# Patient Record
Sex: FEMALE | Race: WHITE | NOT HISPANIC OR LATINO | Employment: STUDENT | URBAN - METROPOLITAN AREA
[De-identification: names, ages, dates, MRNs, and addresses within clinical notes are randomized per-mention and may not be internally consistent; named-entity substitution may affect disease eponyms.]

---
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
## 2023-03-12 ENCOUNTER — OFFICE VISIT (OUTPATIENT)
Dept: URGENT CARE | Facility: CLINIC | Age: 18
End: 2023-03-12

## 2023-03-12 VITALS
SYSTOLIC BLOOD PRESSURE: 127 MMHG | HEART RATE: 99 BPM | DIASTOLIC BLOOD PRESSURE: 76 MMHG | RESPIRATION RATE: 16 BRPM | OXYGEN SATURATION: 99 % | TEMPERATURE: 98.6 F

## 2023-03-12 DIAGNOSIS — J02.9 SORE THROAT: Primary | ICD-10-CM

## 2023-03-12 DIAGNOSIS — J35.8 TONSILLAR EXUDATE: ICD-10-CM

## 2023-03-12 LAB — S PYO AG THROAT QL: NEGATIVE

## 2023-03-12 NOTE — PATIENT INSTRUCTIONS
--Rest, drink plenty of fluids  --Rapid strep test negative  Will send full throat culture, calling if results are positive (anticipate 48-72 hours)  --For sore throat, you can take OTC lozenges, use warm gargles (salt water or apple cider vinegar and honey), herbal teas, or an OTC throat spray (Chloraseptic)  --You can take Tylenol or Motrin/Advil as needed for fever, headache, body aches  Motrin/Advil should be avoided, however, if you have a history of heart disease, bleeding ulcers, or if you take blood thinners  --You should contact your primary care provider and/or go to the ER if your symptoms are not improved or get worse over the next 3-6 days  This includes new onset fever, localized ear pain, sinus pain, as well as worsening sore throat, cough, chest pain, shortness of breath, or significant weakness/fatigue

## 2023-03-12 NOTE — PROGRESS NOTES
[facility]        NAME: Quoc Lee is a 21 y o  adult  : 2005    MRN: [id number]  DATE: 2023  TIME: 1:46 PM    Assessment and Plan   Sore throat [J02 9]  1  Sore throat  POCT rapid strepA   2  Tonsillar exudate  Throat culture         Patient Instructions     --Rest, drink plenty of fluids  --Rapid strep test negative  Will send full throat culture, calling if results are positive (anticipate 48-72 hours)  --For sore throat, you can take OTC lozenges, use warm gargles (salt water or apple cider vinegar and honey), herbal teas, or an OTC throat spray (Chloraseptic)  --You can take Tylenol or Motrin/Advil as needed for fever, headache, body aches  Motrin/Advil should be avoided, however, if you have a history of heart disease, bleeding ulcers, or if you take blood thinners  --You should contact your primary care provider and/or go to the ER if your symptoms are not improved or get worse over the next 3-6 days  This includes new onset fever, localized ear pain, sinus pain, as well as worsening sore throat, cough, chest pain, shortness of breath, or significant weakness/fatigue  Chief Complaint     Chief Complaint   Patient presents with   • Sore Throat     Pt presents with tonsil swelling that was noticed this morning, denies pain with swallowing  History of Present Illness       Here with complaints of drainage in back of throat since this morning  Throat not sore and no pain with swallowing, however  Had cold a week ago, but better no  No current fever, nasal congestion, cough, headache, body aches, fatigue, rash  Had mono in the past    No OTC/home treatments tried  Review of Systems   Review of Systems   Constitutional: Negative for fever  HENT: Negative for sore throat and trouble swallowing  Respiratory: Negative for cough  Neurological: Negative for headaches           Current Medications       Current Outpatient Medications:   • norethindrone-ethinyl estradiol (Junel FE 1/20) 1-20 MG-MCG per tablet, Take 1 tablet by mouth daily, Disp: 28 tablet, Rfl: 11  •  triamcinolone (KENALOG) 0 1 % cream, Apply topically 2 (two) times a day (Patient not taking: Reported on 8/11/2022), Disp: 30 g, Rfl: 0    Current Allergies     Allergies as of 03/12/2023 - Reviewed 03/12/2023   Allergen Reaction Noted   • Penicillins Rash 09/08/2018            The following portions of the patient's history were reviewed and updated as appropriate: allergies, current medications, past family history, past medical history, past social history, past surgical history and problem list      Past Medical History:   Diagnosis Date   • Fracture    • Mononucleosis    • Raynaud's disease        No past surgical history on file  No family history on file  Medications have been verified  Objective   /76   Pulse 99   Temp 98 6 °F (37 °C)   Resp 16   SpO2 99%   No LMP recorded  Physical Exam     Physical Exam  Constitutional:       General: Caitlin Dawn is not in acute distress  Appearance: Caitlin Dawn is well-developed  Chon Andrew is not diaphoretic  HENT:      Head: Normocephalic and atraumatic  Right Ear: External ear normal       Left Ear: Tympanic membrane and external ear normal       Nose: Nose normal  No congestion or rhinorrhea  Mouth/Throat:      Pharynx: Posterior oropharyngeal erythema present  No oropharyngeal exudate  Comments: Tonsils 1-2+, mildly erythematous with few small (1-2 mm) spots of white exudate  Uvula midline  Eyes:      Conjunctiva/sclera: Conjunctivae normal       Pupils: Pupils are equal, round, and reactive to light  Neck:      Thyroid: No thyromegaly  Cardiovascular:      Rate and Rhythm: Normal rate and regular rhythm  Heart sounds: Normal heart sounds  Pulmonary:      Effort: Pulmonary effort is normal       Breath sounds: Normal breath sounds     Abdominal:      General: Bowel sounds are normal       Palpations: Abdomen is soft  Tenderness: There is no abdominal tenderness  Musculoskeletal:      Cervical back: Neck supple  Lymphadenopathy:      Cervical: No cervical adenopathy  Skin:     General: Skin is warm and dry  Neurological:      Mental Status: Philipp Marti is alert and oriented to person, place, and time  Deep Tendon Reflexes: Reflexes are normal and symmetric

## 2023-03-16 LAB — BACTERIA THROAT CULT: NORMAL

## 2023-07-19 DIAGNOSIS — N92.6 IRREGULAR MENSES: ICD-10-CM

## 2023-07-19 RX ORDER — NORETHINDRONE ACETATE AND ETHINYL ESTRADIOL AND FERROUS FUMARATE 1MG-20(21)
KIT ORAL
Qty: 28 TABLET | Refills: 3 | Status: SHIPPED | OUTPATIENT
Start: 2023-07-19

## 2023-07-26 ENCOUNTER — TELEPHONE (OUTPATIENT)
Dept: OBGYN CLINIC | Facility: CLINIC | Age: 18
End: 2023-07-26

## 2023-07-26 DIAGNOSIS — R10.2 PELVIC PAIN: Primary | ICD-10-CM

## 2023-07-26 NOTE — TELEPHONE ENCOUNTER
----- [facility] sent at 7/26/2023  9:52 AM EDT -----  Regarding: Should I make an appointment? Contact: [phone]  Hello, my period has been different this week and I don't know if I should make an appointment about this or not. I usually get cramps and headaches with every period, but this monday and tuesday both cramps and headaches were really bad. On Monday night I experienced really bad cramps to the point that I was feeling pain from my lower left abdomen and left leg. And then today, Wednesday, I ended up having to leave work early due to having waves of nausea. Another thing I should probably add is that I was researching about my cramps (specifically period cramps/pain in my leg), and endometriosis was mentioned. I looked more into endometriosis and I realized I may have more symptoms such as cramp pain in my lower abdomen, pelvis, and lower back/butt (it feels like its on my tail bone); I also experience some pain during sexual intercourse.

## 2023-07-26 NOTE — TELEPHONE ENCOUNTER
Lm patient's as re: recommendation to have pelvic US then office appointment follow up/KA. Rad order for same in patient's chart.

## 2023-07-26 NOTE — TELEPHONE ENCOUNTER
Patient informed of KA's recommendation - she will schedule appointment time & recall office after scheduling to schedule follow up appointment in office.

## 2023-07-26 NOTE — TELEPHONE ENCOUNTER
Patient was to have repeat pelvic US 2/2023 per your recommendation @ appointment 10/2022. She has yearly appointment scheduled 10/12/2023.

## 2023-08-02 ENCOUNTER — HOSPITAL ENCOUNTER (OUTPATIENT)
Dept: ULTRASOUND IMAGING | Facility: HOSPITAL | Age: 18
Discharge: HOME | End: 2023-08-02
Payer: COMMERCIAL

## 2023-08-02 DIAGNOSIS — R10.2 PELVIC PAIN: ICD-10-CM

## 2023-08-02 PROCEDURE — 76856 US EXAM PELVIC COMPLETE: CPT

## 2023-08-02 PROCEDURE — 76830 TRANSVAGINAL US NON-OB: CPT

## 2023-08-15 ENCOUNTER — TELEPHONE (OUTPATIENT)
Dept: OBGYN CLINIC | Facility: CLINIC | Age: 18
End: 2023-08-15

## 2023-09-06 ENCOUNTER — OFFICE VISIT (OUTPATIENT)
Dept: INTERNAL MEDICINE CLINIC | Facility: CLINIC | Age: 18
End: 2023-09-06
Payer: COMMERCIAL

## 2023-09-06 VITALS
TEMPERATURE: 99 F | HEART RATE: 72 BPM | HEIGHT: 64 IN | DIASTOLIC BLOOD PRESSURE: 78 MMHG | BODY MASS INDEX: 20.49 KG/M2 | WEIGHT: 120 LBS | OXYGEN SATURATION: 99 % | SYSTOLIC BLOOD PRESSURE: 116 MMHG

## 2023-09-06 DIAGNOSIS — M70.41 PREPATELLAR BURSITIS OF RIGHT KNEE: Primary | ICD-10-CM

## 2023-09-06 DIAGNOSIS — M25.461 PAIN AND SWELLING OF RIGHT KNEE: ICD-10-CM

## 2023-09-06 DIAGNOSIS — M25.561 PAIN AND SWELLING OF RIGHT KNEE: ICD-10-CM

## 2023-09-06 PROCEDURE — 99204 OFFICE O/P NEW MOD 45 MIN: CPT | Performed by: INTERNAL MEDICINE

## 2023-09-06 RX ORDER — IBUPROFEN 400 MG/1
400 TABLET ORAL EVERY 12 HOURS PRN
Qty: 15 TABLET | Refills: 0 | Status: SHIPPED | OUTPATIENT
Start: 2023-09-06

## 2023-09-06 NOTE — LETTER
September 6, 2023     Patient: Gary Perez  YOB: 2005  Date of Visit: 9/6/2023      To Whom it May Concern: Isidoro Hill is under my professional care. Norvell Lombard was seen in my office on 9/6/2023. Please allow Norvell Lombard to work light duty/limit walking on September 8, 2023 for medical reasons. If you have any questions or concerns, please don't hesitate to call.          Sincerely,          Breana Jones, DO

## 2023-09-06 NOTE — PATIENT INSTRUCTIONS
Motrin twice a day as needed, take with food  Orthopedics appointment  Avoid running or impact exercises with right leg for now    Patellar Tendinitis   WHAT YOU NEED TO KNOW:   What is patellar tendinitis? Patellar tendinitis is inflammation or irritation of the tendon that connects your kneecap to your shinbone. It may be a short-term condition or develop into a long-term weakness in your knee. What increases my risk for patellar tendinitis? Overuse or sudden increase in activity    Activities that involve jumping or quick changes of direction    Lack of flexibility     Muscle weakness or imbalance     Sedentary lifestyle    Certain antibiotics    What are the signs and symptoms of patellar tendinitis? Patellar tendonitis begins with pain or swelling at your knee. You may feel sharp or aching pain before or after activity. Your condition may progress until you feel pain all of the time. How is patellar tendonitis diagnosed? Your healthcare provider will examine your knee and the muscles around your knee. Your provider will ask about your symptoms and when they began. Your provider will also ask if you have recently taken antibiotics or have had a previous knee injury. An ultrasound  uses sound waves to show pictures of your knee joint on a monitor. An ultrasound may be done to check for the cause of your symptoms. An x-ray or MRI  takes pictures to show if you have damage in your patellar tendon. You may be given dye to help the pictures show up better. Tell your healthcare provider if you have ever had an allergic reaction to contrast dye. Do not enter the MRI room with anything metal. Metal can cause serious injury. Tell the healthcare provider if you have any metal in or on your body. How is patellar tendinitis treated? NSAIDs , such as ibuprofen, help decrease swelling, pain, and fever. This medicine is available with or without a doctor's order.  NSAIDs can cause stomach bleeding or kidney problems in certain people. If you take blood thinner medicine, always ask if NSAIDs are safe for you. Always read the medicine label and follow directions. Do not give these medicines to children younger than 6 months without direction from a healthcare provider. Acetaminophen  decreases pain. This medicine is available without a doctor's order. Ask how much to take and how often to take it. Acetaminophen can cause liver damage if not taken correctly. Injections  may reduce inflammation and help your tendon heal.     Surgery  may be needed remove damaged tissue to allow the tendon to heal.    How can I manage my symptoms? Rest  your knee so it can heal. Do not begin an activity until directed by your healthcare provider. Ice  your knee 15 to 20 minutes every hour or as directed. Use and ice pack, or put crushed ice in a bag. Cover it with a towel. Ice prevents tissue damage and decreases swelling and pain. Support  your knee as directed. Ask your healthcare provider for more information on types of braces that support your patellar tendon and allow it to heal.     Go to physical therapy  as directed. A physical therapist can teach you exercises to stretch and strengthen leg muscles that support your tendon. When should I seek immediate care? You hear a sudden snap or pop or see immediate bruising around your knee. You cannot bend your knee or bear weight on your leg. When should I call my doctor? You have a fever. You have sudden swelling in your knee. Your pain continues or gets worse even after you take pain medicine. The skin over your knee becomes red and swollen. You have questions or concerns about your condition or care. CARE AGREEMENT:   You have the right to help plan your care. Learn about your health condition and how it may be treated. Discuss treatment options with your healthcare providers to decide what care you want to receive.  You always have the right to refuse treatment. The above information is an  only. It is not intended as medical advice for individual conditions or treatments. Talk to your doctor, nurse or pharmacist before following any medical regimen to see if it is safe and effective for you. © Copyright [employer] 2022 Information is for End User's use only and may not be sold, redistributed or otherwise used for commercial purposes.

## 2023-09-06 NOTE — PROGRESS NOTES
Name: Todd Contreras      : 2005      MRN: [id number]  Encounter Provider: Carmelina Morton DO  Encounter Date: 2023   Encounter department: [address] [place] [address]     1. Prepatellar bursitis of right knee  Comments:  suspect pre-patellar bursitis/tendonitis. c/w nsaids and avoid high impact exercises/tap dance for now. f/u ortho for possible bursal injection  Orders:  -     Ambulatory Referral to Orthopedic Surgery; Future  -     ibuprofen (MOTRIN) 400 mg tablet; Take 1 tablet (400 mg total) by mouth every 12 (twelve) hours as needed for mild pain or moderate pain (take with food)    2. Pain and swelling of right knee  -     Ambulatory Referral to Orthopedic Surgery; Future  -     ibuprofen (MOTRIN) 400 mg tablet; Take 1 tablet (400 mg total) by mouth every 12 (twelve) hours as needed for mild pain or moderate pain (take with food)        Depression Screening and Follow-up Plan: Patient was screened for depression during today's encounter. They screened negative with a PHQ-2 score of 0. Subjective      HPI     New to practice, here to establish care and with c/o right knee swelling and pain off/on for the last 2 weeks. No previous knee problems or injury and no fall/trauma. Pain was worse while walking a lot at work 2 weeks ago. Her anterior knee swelled as well. It got a bit better with NSAIDs and rest but flared back up again last week and this time with right ankle pain as well. She has not been doing high impact exercises but does tap dance at her [address] [place] [address]. She walks 10K+ steps while at work. She has pain 2-3/10 currently but it was 8.5/10 at its worst 2 weeks ago. She believes she is UTD on her vaccines from her pediatrician. ROS otherwise negative, no other complaints. Review of Systems   Constitutional: Negative for fever. HENT: Negative for congestion. Eyes: Negative for visual disturbance.    Respiratory: Negative for shortness of breath. Cardiovascular: Negative for chest pain. Gastrointestinal: Negative for abdominal pain. Endocrine: Negative for polyuria. Genitourinary: Negative for difficulty urinating. Musculoskeletal: Positive for arthralgias, gait problem (due to off/on knee pain) and joint swelling. Skin: Negative for rash. Allergic/Immunologic: Negative for immunocompromised state. Neurological: Negative for dizziness. Psychiatric/Behavioral: Negative for dysphoric mood. Current Outpatient Medications on File Prior to Visit   Medication Sig   • Junel FE 1/20 1-20 MG-MCG per tablet TAKE ONE TABLET BY MOUTH EVERY DAY   • triamcinolone (KENALOG) 0.1 % cream Apply topically 2 (two) times a day (Patient not taking: Reported on 8/11/2022)       Objective     /78 (BP Location: Left arm, Patient Position: Sitting, Cuff Size: Standard)   Pulse 72   Temp 99 °F (37.2 °C)   Ht 5' 4" (1.626 m)   Wt 54.4 kg (120 lb)   SpO2 99%   BMI 20.60 kg/m²     Physical Exam  Vitals reviewed. Constitutional:       General: Rose Sheffield is not in acute distress. HENT:      Head: Normocephalic and atraumatic. Right Ear: Tympanic membrane normal.      Left Ear: Tympanic membrane normal.   Eyes:      Conjunctiva/sclera: Conjunctivae normal.   Cardiovascular:      Rate and Rhythm: Normal rate and regular rhythm. Heart sounds: No murmur heard. Pulmonary:      Effort: Pulmonary effort is normal.      Breath sounds: No wheezing or rales. Abdominal:      General: Bowel sounds are normal.      Palpations: Abdomen is soft. Tenderness: There is no abdominal tenderness. Musculoskeletal:      Cervical back: Neck supple. Right knee: Swelling (anterior knee(pre-patellar area)) present. No bony tenderness or crepitus. Normal range of motion. Tenderness present over the patellar tendon. Instability Tests: Anterior drawer test negative. Left knee: Normal range of motion.       Instability Tests: Anterior drawer test negative. Right lower leg: No edema. Left lower leg: No edema. Right ankle: No swelling. No tenderness. Normal range of motion. Anterior drawer test negative. Right Achilles Tendon: No tenderness. Comments: No knee pain with kneeling/squatting   Neurological:      Mental Status: Iker Schaefer is alert. Mental status is at baseline.    Psychiatric:         Mood and Affect: Mood normal.         Behavior: Behavior normal.       Joao Molina,

## 2023-09-11 ENCOUNTER — OFFICE VISIT (OUTPATIENT)
Dept: OBGYN CLINIC | Facility: CLINIC | Age: 18
End: 2023-09-11

## 2023-09-11 VITALS
HEIGHT: 64 IN | BODY MASS INDEX: 20.49 KG/M2 | WEIGHT: 120 LBS | SYSTOLIC BLOOD PRESSURE: 90 MMHG | DIASTOLIC BLOOD PRESSURE: 60 MMHG

## 2023-09-11 DIAGNOSIS — M22.2X1 PATELLOFEMORAL SYNDROME OF RIGHT KNEE: Primary | ICD-10-CM

## 2023-09-11 DIAGNOSIS — M25.561 ACUTE PAIN OF RIGHT KNEE: ICD-10-CM

## 2023-09-11 RX ORDER — MELOXICAM 15 MG/1
15 TABLET ORAL DAILY
Qty: 30 TABLET | Refills: 0 | Status: SHIPPED | OUTPATIENT
Start: 2023-09-11

## 2023-09-11 NOTE — PROGRESS NOTES
Patient Name:  Hailey Estes  MRN:  [id number]    Assessment & Plan     Right knee pain, likely patellofemoral dysfunction. 1. Patient exhibits signs and symptoms of right knee patellofemoral dysfunction. 2. Referral placed for formal physical therapy. 3. A prescription for meloxicam to be taken in place of ibuprofen. 4. Jaime pull knee sleeve provided today. 5. Activities as tolerated with modification avoid pain. 6. Follow-up in 6 weeks. Consider MRI at that time as indicated. Chief Complaint     Right knee pain    History of the Present Illness     Hailey Estes is a 21 y.o. adult who reports to the office today for evaluation of the right knee. Patient notes an onset of pain a few weeks ago. Patient denies any injury or trauma. Pain is localized to the anterior aspect of the knee and worse with prolonged standing and walking as well as walking up and down steps. Patient feels her pain is exacerbated with work. Patient does note some swelling anteriorly. Patient denies any weakness or instability or giving way. No numbness or tingling. No fevers or chills. Patient has tried ibuprofen without significant improvement. Physical Exam     BP 90/60   Ht 5' 4" (1.626 m)   Wt 54.4 kg (120 lb)   BMI 20.60 kg/m²     Right knee: No gross deformity. Skin intact. No erythema ecchymosis or swelling. No effusion. No tenderness over the medial and lateral joint line. No tenderness patellar tendon or prepatellar bursa. Full range of motion without significant pain. Stable to varus and valgus stress without pain. Stable Lachman test.  Negative posterior drawer test.  Negative Chuck's test.  Positive patellar grind test.  Negative patellar apprehension test.  Extensor mechanism is intact. Sensation is intact distally. Eyes: Anicteric sclerae. ENT: Trachea midline. Lungs: Normal respiratory effort. CV: Capillary refill is less than 2 seconds. Skin: Intact without erythema.   Lymph: No palpable lymphadenopathy. Neuro: Sensation is grossly intact to light touch. Psych: Mood and affect are appropriate. Data Review     I have personally reviewed pertinent films in PACS, and my interpretation follows:    X-rays right knee 9/11/2023: No acute osseous abnormality. No fracture or dislocation. No significant degenerative disc. There is evidence of some patellar tilt on the sunrise view. Past Medical History:   Diagnosis Date   • Fracture    • Mononucleosis    • Raynaud's disease        Past Surgical History:   Procedure Laterality Date   • CLOSED REDUCTION WRIST FRACTURE Left        Allergies   Allergen Reactions   • Penicillins Rash       Current Outpatient Medications on File Prior to Visit   Medication Sig Dispense Refill   • ibuprofen (MOTRIN) 400 mg tablet Take 1 tablet (400 mg total) by mouth every 12 (twelve) hours as needed for mild pain or moderate pain (take with food) 15 tablet 0   • Junel FE 1/20 1-20 MG-MCG per tablet TAKE ONE TABLET BY MOUTH EVERY DAY 28 tablet 3   • triamcinolone (KENALOG) 0.1 % cream Apply topically 2 (two) times a day (Patient not taking: Reported on 8/11/2022) 30 g 0     No current facility-administered medications on file prior to visit. Social History     Tobacco Use   • Smoking status: Never   • Smokeless tobacco: Never   Vaping Use   • Vaping Use: Never used   Substance Use Topics   • Alcohol use: Never   • Drug use: Yes     Types: Marijuana       Family History   Problem Relation Age of Onset   • Diabetes Maternal Grandmother    • Graves' disease Paternal Grandmother    • Cancer Neg Hx    • Heart disease Neg Hx        Review of Systems     As stated in the HPI. All other systems reviewed and are negative.

## 2023-09-25 ENCOUNTER — EVALUATION (OUTPATIENT)
Dept: PHYSICAL THERAPY | Facility: CLINIC | Age: 18
End: 2023-09-25
Payer: COMMERCIAL

## 2023-09-25 DIAGNOSIS — M22.2X1 PATELLOFEMORAL SYNDROME OF RIGHT KNEE: Primary | ICD-10-CM

## 2023-09-25 PROCEDURE — 97140 MANUAL THERAPY 1/> REGIONS: CPT

## 2023-09-25 PROCEDURE — 97110 THERAPEUTIC EXERCISES: CPT

## 2023-09-25 PROCEDURE — 97161 PT EVAL LOW COMPLEX 20 MIN: CPT

## 2023-09-25 NOTE — PROGRESS NOTES
PT Evaluation     Today's date: 2023  Patient name: Sterling Marshall  : 2005  MRN: [id number]  Referring provider: Ara Lubin*  Dx:   Encounter Diagnosis     ICD-10-CM    1. Patellofemoral syndrome of right knee  M22.2X1 Ambulatory Referral to Physical Therapy          Start Time: 1100  Stop Time: 1150  Total time in clinic (min): 50 minutes    Assessment  Assessment details: Pt is a 19y.o. year old adult presenting to physical therapy for patellofemoral syndrome of right knee. She presents with the following impairments: decreased right hip strength deficits with biggest weakness being with flexion, abduction, and extension, hypermobility medially and laterally of her right patella, TTP inferior to her patella on her right knee, and increased tension in her right quad and ITB affecting her function with walking, running, lifting, and other ADLs. Pt will benefit from skilled physical therapy to address functional limitations noted in evaluation and meet patient goals. Impairments: abnormal muscle firing, abnormal or restricted ROM, activity intolerance, impaired physical strength, lacks appropriate home exercise program, pain with function and poor body mechanics    Symptom irritability: moderateUnderstanding of Dx/Px/POC: good   Prognosis: good    Goals  ST. Pt will be independent with HEP. 2. Pt will be able to walk for 10 minutes with no increase in symptoms in her knee. 3. Pt will improve FOTO score from baseline set during initial evaluation  LT. Pt will be able to run for 15 minutes with no increase in symptoms  2. Pt will improve right hip strength grossly by 2 grades or more  3. Pt will be able to complete leg workout at gym without increase in symptoms  4.  Pt will reach FOTO goal set by alesha during initial evaluation    Plan  Patient would benefit from: PT eval and skilled physical therapy  Planned modality interventions: biofeedback, manual electrical stimulation, Virtual visit made for today at 2pm microcurrent electrical stimulation, TENS, electrical stimulation/Russian stimulation, thermotherapy: hydrocollator packs, cryotherapy and unattended electrical stimulation  Planned therapy interventions: abdominal trunk stabilization, joint mobilization, manual therapy, massage, ADL retraining, neuromuscular re-education, body mechanics training, patient education, postural training, strengthening, stretching, therapeutic activities, therapeutic exercise, flexibility, functional ROM exercises and home exercise program  Frequency: 1x week  Duration in weeks: 6  Treatment plan discussed with: patient        Subjective Evaluation    History of Present Illness  Mechanism of injury: Pt is a 19 y.o. female presenting with patellofemoral syndrome of her right knee. Pt reports that this pain has been present for 3-4 weeks now, with no MINDY. Pt reports that she had X-rays and showed that everything in her knee was intact, but there was still swelling present. She was prescribed an anti-inflammatory which helped with the swelling. Pt noted their pain is usually located inferior to her patella and both medially and laterally. Pt reports that walking and running is what causes them the most pain and are the most difficult movements for them to perfrom. She report that her knee has been feeling better over the last week or so, but she is still weary about running and using leg machines at the gym. Pt reports that rest and avoiding aggrevating movements helps relieve the pain. Pt stated that their main goals for therapy are pain reduction and improved tolerance in her knee with high level activities like running and lifting.   Quality of life: excellent    Patient Goals  Patient goals for therapy: decreased edema, decreased pain, improved balance, increased motion, increased strength and independence with ADLs/IADLs    Pain  Current pain ratin  At best pain ratin  At worst pain ratin  Quality: throbbing  Relieving factors: rest  Aggravating factors: walking and stair climbing          Objective     Active Range of Motion   Left Knee   Flexion: WFL  Extension: WFL    Right Knee   Flexion: WFL  Extension: [facility]    Mobility   Patellar Mobility:   Left Knee   WFL: medial, lateral, superior and inferior. Right Knee   WFL: superior and inferior  Hypermobile: medial and lateral     Strength/Myotome Testing     Left Hip   Planes of Motion   Flexion: 5  Extension: 5  Abduction: 5  External rotation: 5  Internal rotation: 5    Right Hip   Planes of Motion   Flexion: 4  Extension: 4  Abduction: 4-  External rotation: 4+  Internal rotation: 5    Left Knee   Flexion: 5  Extension: 5    Right Knee   Flexion: 5  Extension: 5    Left Ankle/Foot   Dorsiflexion: 5  Plantar flexion: 5  Inversion: 5  Eversion: 5    Right Ankle/Foot   Dorsiflexion: 5  Plantar flexion: 5  Inversion: 5  Eversion: 5    Tests     Left Knee   Negative anterior drawer, anterior Lachman, patella-femoral grind, posterior drawer, Thessaly's test at 5 degrees, valgus stress test at 0 degrees, valgus stress test at 30 degrees and varus stress test at 0 degrees. Right Knee   Negative anterior drawer, anterior Lachman, patella-femoral grind, posterior drawer, Thessaly's test at 5 degrees, valgus stress test at 0 degrees, valgus stress test at 30 degrees and varus stress test at 0 degrees. General Comments:      Knee Comments  Gait: WFL  Squatting: Good depth, narrow JOSE, heels come off ground  Increased tension in right quad and ITB  TTP inferior to patella on right knee. Only noted tenderness in a certain spot, with only minimal pain noted.               Precautions:       Date 9/25            Visit # IE            FOTO IE             Re-eval IE              Manuals 9/25            LE Str (Hip Flex/ITB)             Quad/ITB STM                                       Neuro Re-Ed 9/25            Hip Flexor Str 3x30"            Bridges             [facility]             [place] [employer] Walks             Ther Ex 9/25            [place] 3-way SLR 2x10 ea 3"            S/L Hip Abd 10x5"            Split Squats             LP             SL LP             3-way Hip Kicks             SL Squat                          Ther Activity 9/25            Squatting             Step-Ups             Gait Training 9/25                                      Modalities 9/25

## 2023-10-04 ENCOUNTER — OFFICE VISIT (OUTPATIENT)
Dept: PHYSICAL THERAPY | Facility: CLINIC | Age: 18
End: 2023-10-04
Payer: COMMERCIAL

## 2023-10-04 DIAGNOSIS — M22.2X1 PATELLOFEMORAL SYNDROME OF RIGHT KNEE: Primary | ICD-10-CM

## 2023-10-04 PROCEDURE — 97112 NEUROMUSCULAR REEDUCATION: CPT

## 2023-10-04 PROCEDURE — 97110 THERAPEUTIC EXERCISES: CPT

## 2023-10-04 PROCEDURE — 97530 THERAPEUTIC ACTIVITIES: CPT

## 2023-10-04 NOTE — PROGRESS NOTES
Daily Note     Today's date: 10/4/2023  Patient name: Justine Mcqueen  : 2005  MRN: [id number]  Referring provider: Ramez Bass  Dx:   Encounter Diagnosis     ICD-10-CM    1. Patellofemoral syndrome of right knee  M22.2X1           Start Time: 1230  Stop Time: 1310  Total time in clinic (min): 40 minutes    Subjective: Pt reports that her knee is feeling good coming into today's therapy session. Objective: See treatment diary below      Assessment: Pt tolerated treatment well. Added leg press, both single leg and double leg, slider lunges, split squats, side-stepping, monster walks, squatting, and step-ups to challenge LE musculature strength and endurance. Pt was challenged by additional exercises but was able to complete all sets and reps with proper form and appropriate levels of fatigue post-session. Patient exhibited good technique with therapeutic exercises and would benefit from continued PT      Plan: Continue per plan of care. Progress treatment as tolerated.        Precautions:       Date 9/25 10/4           Visit # IE 2           FOTO IE             Re-eval IE              Manuals 9/25 10/4           LE Str (Hip Flex/ITB)             Quad/ITB STM                                       Neuro Re-Ed 9/25 10/4           Hip Flexor Str 3x30"            Bridges             Clamshells             Slider-Lunges  2x15 ea           [place] Side-Planks             Side-Stepping  5 laps PurpTB           Monster Walks  5 laps PurpTB           Ther Ex 9/25 10/4           Bike  6'           [place] 3-way SLR 2x10 ea 3"            S/L Hip Abd 10x5"            Split Squats  2x20           LP  125# 3x10           SL LP  75# 3x10           3-way Hip Kicks  NV           SL Squat                          Ther Activity 9/25 10/4           Squatting  3x10           Step-Ups  2x20 2R           Gait Training                                       Modalities

## 2023-10-11 ENCOUNTER — OFFICE VISIT (OUTPATIENT)
Dept: PHYSICAL THERAPY | Facility: CLINIC | Age: 18
End: 2023-10-11
Payer: COMMERCIAL

## 2023-10-11 DIAGNOSIS — M22.2X1 PATELLOFEMORAL SYNDROME OF RIGHT KNEE: Primary | ICD-10-CM

## 2023-10-11 PROCEDURE — 97530 THERAPEUTIC ACTIVITIES: CPT

## 2023-10-11 PROCEDURE — 97112 NEUROMUSCULAR REEDUCATION: CPT

## 2023-10-11 PROCEDURE — 97110 THERAPEUTIC EXERCISES: CPT

## 2023-10-11 NOTE — PROGRESS NOTES
Daily Note     Today's date: 10/11/2023  Patient name: Dirk Peace  : 2005  MRN: [id number]  Referring provider: Calixto Madrigal*  Dx:   Encounter Diagnosis     ICD-10-CM    1. Patellofemoral syndrome of right knee  M22.2X1           Start Time: 1230  Stop Time: 1315  Total time in clinic (min): 45 minutes    Subjective: Pt reports that her knee is feeling better with less frequent symptoms. Objective: See treatment diary below      Assessment: Pt tolerated treatment well. Added 3-way hip kicks to challenge both posterior chain strength and endurance, as well as dynamic stability on contralateral leg. Added [place] side-planks to challenge core stabilization and hip abductor activation and endurance. Progressed leg press both single leg and double leg. Progressed single leg from 75# to 95# and double leg from 125# to 155#. Patient exhibited good technique with therapeutic exercises and would benefit from continued PT      Plan: Continue per plan of care. Progress treatment as tolerated.        Precautions:       Date 9/25 10/4 10/11          Visit # IE 2 3          FOTO IE             Re-eval IE              Manuals 9/25 10/4 10/11          LE Str (Hip Flex/ITB)             Quad/ITB STM                                       Neuro Re-Ed 9/25 10/4 10/11          Hip Flexor Str 3x30"            Bridges             Clamshells             Slider-Lunges  2x15 ea 2x15 ea          [place] Side-Planks   2x20"          Side-Stepping  5 laps PurpTB 5 laps PurpTB          Monster Walks  5 laps PurpTB 5 laps PurpTB          Ther Ex 9/25 10/4 10/11          Bike   6'          [place] 3-way SLR 2x10 ea 3"  NV          S/L Hip Abd 10x5"            Split Squats  2x20 2x20          LP  125# 3x10 155# 2x30          SL LP  75# 3x10 95# 2x30          3-way Hip Kicks  [place] 2x15 ea 5# on airex          SL Squat                          Ther Activity 9/25 10/4 10/11          Squatting  3x10 3x10 PurpTB Step-Ups  2x20 2R 2x20 5# 2R F/B S/S          Gait Training 9/25                                      Modalities 9/25

## 2023-10-12 ENCOUNTER — ANNUAL EXAM (OUTPATIENT)
Dept: OBGYN CLINIC | Facility: CLINIC | Age: 18
End: 2023-10-12
Payer: COMMERCIAL

## 2023-10-12 VITALS
DIASTOLIC BLOOD PRESSURE: 60 MMHG | HEIGHT: 64 IN | WEIGHT: 119.6 LBS | BODY MASS INDEX: 20.42 KG/M2 | SYSTOLIC BLOOD PRESSURE: 98 MMHG

## 2023-10-12 DIAGNOSIS — N94.6 DYSMENORRHEA: ICD-10-CM

## 2023-10-12 DIAGNOSIS — Z01.419 ENCOUNTER FOR ANNUAL ROUTINE GYNECOLOGICAL EXAMINATION: Primary | ICD-10-CM

## 2023-10-12 PROCEDURE — 99213 OFFICE O/P EST LOW 20 MIN: CPT | Performed by: OBSTETRICS & GYNECOLOGY

## 2023-10-12 RX ORDER — LEVONORGESTREL AND ETHINYL ESTRADIOL 0.15-0.03
1 KIT ORAL DAILY
Qty: 90 TABLET | Refills: 1 | Status: SHIPPED | OUTPATIENT
Start: 2023-10-12

## 2023-10-12 NOTE — PROGRESS NOTES
Assessment/Plan:  Offered STD screening, declines. Reviewed menstrual diary discussed options including continuous OCPs. Discussed the risks and benefits. At this point she will finish out her current package of Junel 1/20 and then switch to [place] x 6 months. She will keep a menstrual diary and call with update in 4 months. All questions answered at this time. Return to office in 1 year or as needed. Discussed next annual visit with recommendations of Pap smear/cervical cancer screening. No problem-specific Assessment & Plan notes found for this encounter. Diagnoses and all orders for this visit:    Encounter for annual routine gynecological examination    Dysmenorrhea  -     levonorgestrel-ethinyl estradiol (Jolessa) 0.15-0.03 MG per tablet; Take 1 tablet by mouth daily    Other orders  -     Cholecalciferol (VITAMIN D-3 PO); Take by mouth          Subjective:      Patient ID: Dylan Ch is a 19 y.o. adult. HPI    This is a very pleasant 25-year-old female G0 scheduled for annual visit in office for just office discussion. She has been on Loestrin 1/20 since 1/2022. Her menstrual cycles are approximately every 4 weeks lasting 3 days with no breakthrough bleeding. Over the last several months she has had increased cramping usually 2 days prior to menses lasting 3 days. She prefers not to take NSAIDs. She denies any changes in bowel or bladder function. There is been no nausea vomiting or headaches. She is sexually active, new partner last 6 months. She is using condoms regularly. 08/2021 change from Ortho-Tri-Cyclen Lo to Ortho-Tri-Cyclen, side effects vomiting first week active pills)     She does have a history of 1.1 cm simple cyst vesicovaginal space. Diagnosed on ultrasound.     The following portions of the patient's history were reviewed and updated as appropriate: allergies, current medications, past family history, past medical history, past social history, past surgical history, and problem list.    Review of Systems   Constitutional:  Negative for fatigue, fever and unexpected weight change. Respiratory:  Negative for cough, chest tightness, shortness of breath and wheezing. Cardiovascular: Negative. Negative for chest pain and palpitations. Gastrointestinal: Negative. Negative for abdominal distention, abdominal pain, blood in stool, constipation, diarrhea, nausea and vomiting. Genitourinary: Negative. Negative for difficulty urinating, dyspareunia, dysuria, flank pain, frequency, genital sores, hematuria, pelvic pain, urgency, vaginal bleeding, vaginal discharge and vaginal pain. Skin:  Negative for rash.          Objective:      BP 98/60   Ht 5' 4" (1.626 m)   Wt 54.3 kg (119 lb 9.6 oz)   LMP 10/05/2023 (Exact Date)   BMI 20.53 kg/m²          Physical Exam

## 2023-10-18 ENCOUNTER — OFFICE VISIT (OUTPATIENT)
Dept: PHYSICAL THERAPY | Facility: CLINIC | Age: 18
End: 2023-10-18
Payer: COMMERCIAL

## 2023-10-18 DIAGNOSIS — M22.2X1 PATELLOFEMORAL SYNDROME OF RIGHT KNEE: Primary | ICD-10-CM

## 2023-10-18 PROCEDURE — 97110 THERAPEUTIC EXERCISES: CPT

## 2023-10-18 PROCEDURE — 97112 NEUROMUSCULAR REEDUCATION: CPT

## 2023-10-18 NOTE — PROGRESS NOTES
Daily Note     Today's date: 10/18/2023  Patient name: Demetria Waggoner  : 2005  MRN: [id number]  Referring provider: Jana Durham*  Dx:   Encounter Diagnosis     ICD-10-CM    1. Patellofemoral syndrome of right knee  M22.2X1           Start Time: 1230  Stop Time: 1324  Total time in clinic (min): 54 minutes    Subjective: Pt reports that her knee was slightly sore after last therapy session, but the soreness went away after a day or two. Objective: See treatment diary below      Assessment: Pt tolerated treatment well. Added agility ladder to functionally challenge knee through high speed-high intensity sport related activity. Added single leg trampoline ball toss on foam pad to challenge strength, endurance, and dynamic balance during single leg activities. Progressed [place] 3-way SLR by adding 3# ankle weights. Pt tolerated progression well being able to complete all sets and reps with proper form and appropriate levels of fatigue post session. Patient exhibited good technique with therapeutic exercises and would benefit from continued PT      Plan: Continue per plan of care. Progress treatment as tolerated.        Precautions:       Date 9/25 10/4 10/11 10/18         Visit # IE 2 3 4         FOTO IE             Re-eval IE              Manuals 9/25 10/4 10/11 10/18         LE Str (Hip Flex/ITB)             Quad/ITB STM                                       Neuro Re-Ed 9/25 10/4 10/11 10/18         Hip Flexor Str 3x30"            Bridges             Clamshells             Slider-Lunges  2x15 ea 2x15 ea          [place] Side-Planks   2x20" 4x10" ea         Side-Stepping  5 laps PurpTB 5 laps PurpTB 5 laps PurpTB         Monster Walks  5 laps PurpTB 5 laps PurpTB 5 laps PurpTB         Ther Ex 9/25 10/4 10/11 10/18         TM Walking    8'         Bike  6' 6'          [place] 3-way SLR 2x10 ea 3"  NV 2x15 ea 3" 3#         S/L Hip Abd 10x5"            Split Squats  2x20 2x20          LP  125# 3x10 155# 2x30 125# 2x40 ea         SL LP  75# 3x10 95# 2x30 75# 2x40 ea         3-way Hip Kicks  [place] 2x15 ea 5# on airex          SL Squat             SL Ball Trampoline Throw    On foam blue ball 3x30"         Agility Ladder    12'         Ther Activity 9/25 10/4 10/11          Squatting  3x10 3x10 PurpTB          Step-Ups  2x20 2R 2x20 5# 2R F/B S/S          Gait Training 9/25                                      Modalities 9/25

## 2023-10-23 ENCOUNTER — OFFICE VISIT (OUTPATIENT)
Dept: OBGYN CLINIC | Facility: CLINIC | Age: 18
End: 2023-10-23
Payer: COMMERCIAL

## 2023-10-23 VITALS
BODY MASS INDEX: 20.32 KG/M2 | DIASTOLIC BLOOD PRESSURE: 51 MMHG | SYSTOLIC BLOOD PRESSURE: 104 MMHG | HEIGHT: 64 IN | WEIGHT: 119 LBS

## 2023-10-23 DIAGNOSIS — M22.2X1 PATELLOFEMORAL SYNDROME OF RIGHT KNEE: Primary | ICD-10-CM

## 2023-10-23 PROCEDURE — 99213 OFFICE O/P EST LOW 20 MIN: CPT | Performed by: PHYSICIAN ASSISTANT

## 2023-10-23 NOTE — PROGRESS NOTES
Patient Name:  Van Bryant  MRN:  [id number]    Assessment & Plan     Resolved right knee patellofemoral dysfunction. Patient reports zero pain today after completing formal physical therapy and home exercises. At this point patient may return to normal activities as tolerated. Recommend continue home exercises and incorporate them into patient's daily routine. MRI not indicated at this time. Follow-up as needed. Chief Complaint     Follow-up right knee pain    History of the Present Illness     Van Bryant is a 19 y.o. adult who reports to the office today for follow-up regarding their right knee pain. Patient was initially seen on 9/11/2023. At that time patient was referred to physical therapy for suspected patellofemoral dysfunction. Patient returns to the office today noting full resolution of their pain. Patient completed formal therapy and has been performing home exercises. Patient no longer notes any pain swelling weakness or instability. No numbness or tingling. No fevers or chills. Patient is extremely happy with the result. Physical Exam     /51   Ht 5' 4" (1.626 m)   Wt 54 kg (119 lb)   LMP 10/05/2023 (Exact Date)   BMI 20.43 kg/m²     Right knee: No gross deformity. Skin intact without erythema ecchymosis or swelling. No effusion. No joint line tenderness. Full range of motion without pain. Stable to varus and valgus stress without pain. Stable Lachman test.  Negative posterior drawer test.  Negative Chuck's test.  Negative patellar grind test.  Extensor mechanism intact. Negative patellar apprehension test.  Sensation intact distally. Eyes: Anicteric sclerae. ENT: Trachea midline. Lungs: Normal respiratory effort. CV: Capillary refill is less than 2 seconds. Skin: Intact without erythema. Lymph: No palpable lymphadenopathy. Neuro: Sensation is grossly intact to light touch. Psych: Mood and affect are appropriate.       Past Medical History: Diagnosis Date    Fracture     Mononucleosis     Raynaud's disease        Past Surgical History:   Procedure Laterality Date    CLOSED REDUCTION WRIST FRACTURE Left        Allergies   Allergen Reactions    Penicillins Rash       Current Outpatient Medications on File Prior to Visit   Medication Sig Dispense Refill    Cholecalciferol (VITAMIN D-3 PO) Take by mouth      levonorgestrel-ethinyl estradiol (Jolessa) 0.15-0.03 MG per tablet Take 1 tablet by mouth daily 90 tablet 1    ibuprofen (MOTRIN) 400 mg tablet Take 1 tablet (400 mg total) by mouth every 12 (twelve) hours as needed for mild pain or moderate pain (take with food) (Patient not taking: Reported on 10/23/2023) 15 tablet 0    meloxicam (Mobic) 15 mg tablet Take 1 tablet (15 mg total) by mouth daily (Patient not taking: Reported on 10/12/2023) 30 tablet 0    triamcinolone (KENALOG) 0.1 % cream Apply topically 2 (two) times a day (Patient not taking: Reported on 8/11/2022) 30 g 0     No current facility-administered medications on file prior to visit. Social History     Tobacco Use    Smoking status: Never    Smokeless tobacco: Never   Vaping Use    Vaping Use: Never used   Substance Use Topics    Alcohol use: Never    Drug use: Yes     Types: Marijuana       Family History   Problem Relation Age of Onset    Diabetes Maternal Grandmother     Graves' disease Paternal Grandmother     Cancer Neg Hx     Heart disease Neg Hx        Review of Systems     As stated in the HPI. All other systems reviewed and are negative.

## 2023-10-25 ENCOUNTER — APPOINTMENT (OUTPATIENT)
Dept: PHYSICAL THERAPY | Facility: CLINIC | Age: 18
End: 2023-10-25
Payer: COMMERCIAL

## 2023-11-22 ENCOUNTER — OFFICE VISIT (OUTPATIENT)
Dept: URGENT CARE | Facility: CLINIC | Age: 18
End: 2023-11-22
Payer: COMMERCIAL

## 2023-11-22 VITALS
TEMPERATURE: 98.3 F | OXYGEN SATURATION: 99 % | HEART RATE: 85 BPM | RESPIRATION RATE: 22 BRPM | SYSTOLIC BLOOD PRESSURE: 109 MMHG | DIASTOLIC BLOOD PRESSURE: 60 MMHG

## 2023-11-22 DIAGNOSIS — R30.0 DYSURIA: Primary | ICD-10-CM

## 2023-11-22 LAB
SL AMB  POCT GLUCOSE, UA: ABNORMAL
SL AMB LEUKOCYTE ESTERASE,UA: ABNORMAL
SL AMB POCT BILIRUBIN,UA: ABNORMAL
SL AMB POCT BLOOD,UA: ABNORMAL
SL AMB POCT CLARITY,UA: CLEAR
SL AMB POCT COLOR,UA: ABNORMAL
SL AMB POCT KETONES,UA: ABNORMAL
SL AMB POCT NITRITE,UA: ABNORMAL
SL AMB POCT PH,UA: 6
SL AMB POCT SPECIFIC GRAVITY,UA: 1
SL AMB POCT URINE PROTEIN: ABNORMAL
SL AMB POCT UROBILINOGEN: 0.2

## 2023-11-22 PROCEDURE — 81002 URINALYSIS NONAUTO W/O SCOPE: CPT | Performed by: FAMILY MEDICINE

## 2023-11-22 PROCEDURE — 99213 OFFICE O/P EST LOW 20 MIN: CPT | Performed by: FAMILY MEDICINE

## 2023-11-22 PROCEDURE — 87086 URINE CULTURE/COLONY COUNT: CPT | Performed by: FAMILY MEDICINE

## 2023-11-22 NOTE — PROGRESS NOTES
[facility]        NAME: Madison Segovia is a 19 y.o. female  : 2005    MRN: [id number]  DATE: 2023  TIME: 2:32 PM    Assessment and Plan   Dysuria [R30.0]  1. Dysuria  POCT urine dip    Urine culture            Patient Instructions     Urine Dip completed today showing small leuks and mod blood. Will be sent for culture. Antibiotics to be given if positive. Follow-up with PCP in the next 1-2 days for re-evaluation if symptoms continue or worsen. Go to the ED if any fevers, malaise, flank pain, new or worsening symptoms or other concerning symptoms. Chief Complaint     Chief Complaint   Patient presents with   • Possible UTI     Began last night, pressure, frequency, burning, pain with urination; no blood noted in urine; no medications taken          History of Present Illness     Madison Segovia is a 19 y.o. female presenting to the office today for dysuria. Symptoms have been present for 2 days, and include pressure, frequency, burning and pain. She has tried nothing for her symptoms, to date. Review of Systems     Review of Systems   Constitutional:  Negative for chills and fever. HENT:  Negative for congestion, postnasal drip and sore throat. Respiratory:  Negative for cough, shortness of breath and wheezing. Cardiovascular:  Negative for chest pain. Gastrointestinal:  Negative for abdominal distention, abdominal pain (suprapubic pain), nausea and vomiting. Genitourinary:  Positive for dysuria, frequency and urgency. Negative for flank pain and hematuria. Musculoskeletal:  Negative for back pain. Neurological:  Negative for dizziness, syncope, light-headedness and headaches. Psychiatric/Behavioral:  Negative for agitation and confusion. All other systems reviewed and are negative.       Current Medications       Current Outpatient Medications:   •  Cholecalciferol (VITAMIN D-3 PO), Take by mouth, Disp: , Rfl:   •  levonorgestrel-ethinyl estradiol (Getachew Balbina) 0.15-0.03 MG per tablet, Take 1 tablet by mouth daily, Disp: 90 tablet, Rfl: 1  •  ibuprofen (MOTRIN) 400 mg tablet, Take 1 tablet (400 mg total) by mouth every 12 (twelve) hours as needed for mild pain or moderate pain (take with food) (Patient not taking: Reported on 10/23/2023), Disp: 15 tablet, Rfl: 0  •  meloxicam (Mobic) 15 mg tablet, Take 1 tablet (15 mg total) by mouth daily (Patient not taking: Reported on 10/12/2023), Disp: 30 tablet, Rfl: 0  •  triamcinolone (KENALOG) 0.1 % cream, Apply topically 2 (two) times a day (Patient not taking: Reported on 8/11/2022), Disp: 30 g, Rfl: 0    Current Allergies     Allergies as of 11/22/2023 - Reviewed 11/22/2023   Allergen Reaction Noted   • Penicillins Rash 09/08/2018            The following portions of the patient's history were reviewed and updated as appropriate: allergies, current medications, past family history, past medical history, past social history, past surgical history and problem list.     Past Medical History:   Diagnosis Date   • Fracture    • Mononucleosis    • Raynaud's disease        Past Surgical History:   Procedure Laterality Date   • CLOSED REDUCTION WRIST FRACTURE Left        Family History   Problem Relation Age of Onset   • Diabetes Maternal Grandmother    • Graves' disease Paternal Grandmother    • Cancer Neg Hx    • Heart disease Neg Hx        Medications have been verified. Objective     /60   Pulse 85   Temp 98.3 °F (36.8 °C) (Temporal)   Resp 22   SpO2 99%   No LMP recorded. Physical Exam     Physical Exam  Vitals reviewed. Constitutional:       General: She is not in acute distress. Appearance: Normal appearance. She is not ill-appearing. HENT:      Head: Normocephalic and atraumatic. Eyes:      Extraocular Movements: Extraocular movements intact. Conjunctiva/sclera: Conjunctivae normal.   Cardiovascular:      Rate and Rhythm: Normal rate and regular rhythm. Pulses: Normal pulses. Heart sounds: Normal heart sounds. No murmur heard. Pulmonary:      Effort: Pulmonary effort is normal. No respiratory distress. Breath sounds: Normal breath sounds. Abdominal:      General: Bowel sounds are normal.      Tenderness: There is no abdominal tenderness (suprapubic). There is no right CVA tenderness or left CVA tenderness. Musculoskeletal:      Cervical back: Normal range of motion. Skin:     General: Skin is warm. Neurological:      General: No focal deficit present. Mental Status: She is alert.    Psychiatric:         Mood and Affect: Mood normal.         Behavior: Behavior normal.         Judgment: Judgment normal.

## 2023-11-24 LAB — BACTERIA UR CULT: NORMAL

## 2023-11-27 ENCOUNTER — OFFICE VISIT (OUTPATIENT)
Dept: URGENT CARE | Facility: CLINIC | Age: 18
End: 2023-11-27
Payer: COMMERCIAL

## 2023-11-27 ENCOUNTER — TELEPHONE (OUTPATIENT)
Dept: INTERNAL MEDICINE CLINIC | Facility: CLINIC | Age: 18
End: 2023-11-27

## 2023-11-27 VITALS
RESPIRATION RATE: 16 BRPM | SYSTOLIC BLOOD PRESSURE: 112 MMHG | OXYGEN SATURATION: 100 % | HEART RATE: 130 BPM | DIASTOLIC BLOOD PRESSURE: 72 MMHG | TEMPERATURE: 98.7 F

## 2023-11-27 DIAGNOSIS — N30.90 CYSTITIS: ICD-10-CM

## 2023-11-27 DIAGNOSIS — R30.0 DYSURIA: Primary | ICD-10-CM

## 2023-11-27 LAB
SL AMB  POCT GLUCOSE, UA: NEGATIVE
SL AMB LEUKOCYTE ESTERASE,UA: ABNORMAL
SL AMB POCT BILIRUBIN,UA: NEGATIVE
SL AMB POCT BLOOD,UA: ABNORMAL
SL AMB POCT CLARITY,UA: ABNORMAL
SL AMB POCT COLOR,UA: ABNORMAL
SL AMB POCT KETONES,UA: NEGATIVE
SL AMB POCT NITRITE,UA: POSITIVE
SL AMB POCT PH,UA: 6
SL AMB POCT SPECIFIC GRAVITY,UA: 1.01
SL AMB POCT URINE HCG: NEGATIVE
SL AMB POCT URINE PROTEIN: ABNORMAL
SL AMB POCT UROBILINOGEN: 0.2

## 2023-11-27 PROCEDURE — 81025 URINE PREGNANCY TEST: CPT | Performed by: NURSE PRACTITIONER

## 2023-11-27 PROCEDURE — 81002 URINALYSIS NONAUTO W/O SCOPE: CPT | Performed by: NURSE PRACTITIONER

## 2023-11-27 PROCEDURE — 87086 URINE CULTURE/COLONY COUNT: CPT | Performed by: NURSE PRACTITIONER

## 2023-11-27 PROCEDURE — 99213 OFFICE O/P EST LOW 20 MIN: CPT | Performed by: NURSE PRACTITIONER

## 2023-11-27 PROCEDURE — 87077 CULTURE AEROBIC IDENTIFY: CPT | Performed by: NURSE PRACTITIONER

## 2023-11-27 PROCEDURE — 87186 SC STD MICRODIL/AGAR DIL: CPT | Performed by: NURSE PRACTITIONER

## 2023-11-27 RX ORDER — NITROFURANTOIN 25; 75 MG/1; MG/1
100 CAPSULE ORAL 2 TIMES DAILY
Qty: 10 CAPSULE | Refills: 0 | Status: SHIPPED | OUTPATIENT
Start: 2023-11-27 | End: 2023-12-02

## 2023-11-27 NOTE — PROGRESS NOTES
[facility]        NAME: Shannon Moore is a 19 y.o. female  : 2005    MRN: [id number]  DATE: 2023  TIME: 2:34 PM    Assessment and Plan   Dysuria [R30.0]  1. Dysuria  POCT urine dip    Urine culture    POCT urine HCG    nitrofurantoin (MACROBID) 100 mg capsule        UA positive for,blood, leuks, nitrates  Will send for culture and sensitivity. Will start course of Macrobid. Follow-up with PCP. Patient agreement plan. Patient Instructions     Follow up with PCP in 3-5 days. Proceed to  ER if symptoms worsen. Chief Complaint     Chief Complaint   Patient presents with    Possible UTI     Patient was seen last week for dysuria and urinary frequency and the urine culture came back negative. Patient still has the same sxs and now has lower abd pain         History of Present Illness   Shannon Moore presents to the clinic c/o    Possible UTI (Patient was seen last week for dysuria and urinary frequency and the urine culture came back negative. Patient still has the same sxs and now has lower abd pain)  Symptoms worsened and now has lower abd and low back pain. Review of Systems   Review of Systems   All other systems reviewed and are negative.         Current Medications     Long-Term Medications   Medication Sig Dispense Refill    levonorgestrel-ethinyl estradiol (Jolessa) 0.15-0.03 MG per tablet Take 1 tablet by mouth daily 90 tablet 1    ibuprofen (MOTRIN) 400 mg tablet Take 1 tablet (400 mg total) by mouth every 12 (twelve) hours as needed for mild pain or moderate pain (take with food) (Patient not taking: Reported on 10/23/2023) 15 tablet 0    meloxicam (Mobic) 15 mg tablet Take 1 tablet (15 mg total) by mouth daily (Patient not taking: Reported on 10/12/2023) 30 tablet 0    triamcinolone (KENALOG) 0.1 % cream Apply topically 2 (two) times a day (Patient not taking: Reported on 2022) 30 g 0       Current Allergies     Allergies as of 2023 - Reviewed 11/27/2023   Allergen Reaction Noted    Penicillins Rash 09/08/2018            The following portions of the patient's history were reviewed and updated as appropriate: allergies, current medications, past family history, past medical history, past social history, past surgical history and problem list.    Objective   /72   Pulse (!) 130   Temp 98.7 °F (37.1 °C) (Tympanic)   Resp 16   SpO2 100%        Physical Exam     Physical Exam  Vitals and nursing note reviewed. Constitutional:       Appearance: Normal appearance. She is well-developed. HENT:      Head: Normocephalic and atraumatic. Right Ear: Hearing normal.      Left Ear: Hearing normal.      Mouth/Throat:      Lips: Pink. Mouth: Mucous membranes are moist.      Pharynx: Oropharynx is clear. Eyes:      General: Lids are normal.      Conjunctiva/sclera: Conjunctivae normal.      Pupils: Pupils are equal, round, and reactive to light. Cardiovascular:      Rate and Rhythm: Normal rate and regular rhythm. Heart sounds: Normal heart sounds, S1 normal and S2 normal.   Pulmonary:      Effort: Pulmonary effort is normal.      Breath sounds: Normal breath sounds. Abdominal:      General: Abdomen is flat. Bowel sounds are normal.      Palpations: Abdomen is soft. Tenderness: There is abdominal tenderness in the suprapubic area. There is no right CVA tenderness or left CVA tenderness. Musculoskeletal:         General: Normal range of motion. Cervical back: Full passive range of motion without pain, normal range of motion and neck supple. Skin:     General: Skin is warm and dry. Neurological:      General: No focal deficit present. Mental Status: She is alert and oriented to person, place, and time. Psychiatric:         Mood and Affect: Mood normal.         Speech: Speech normal.         Behavior: Behavior normal. Behavior is cooperative. Thought Content:  Thought content normal.         Judgment: Judgment normal.

## 2023-11-27 NOTE — TELEPHONE ENCOUNTER
The urgent care ordered her an antibiotic for urine infection today    Let me know if add'l questions

## 2023-11-27 NOTE — TELEPHONE ENCOUNTER
Keren left this message    Hi, my name is Kusum Olivera. My birthday is September 15th, 2005 and my phone number is [phone]. Starting on Wednesday. I've been having symptoms of a UTI and I went to a walk in. They said the results were borderline. It was sent for culture. It was nothing bacterial. I didn't get any antibiotics or anything and was on. I was taking AZO and I've been drinking fluid, but I don't think the symptoms have gone away. I'm still taking Azio and whenever I can tell when it's fully out of my system because then all the symptoms are back. And now starting of yesterday, I've been having stomach pains and today I've been feeling nauseous. So I was thinking of scheduling an appointment if that's needed. Again. My phone number is [phone]. Thank you.

## 2023-11-27 NOTE — PATIENT INSTRUCTIONS
Urinary Tract Infection in Women   AMBULATORY CARE:   A urinary tract infection (UTI)  is caused by bacteria that get inside your urinary tract. Your urinary tract includes your kidneys, ureters, bladder, and urethra. A UTI is more common in your lower urinary tract, which includes your bladder and urethra. Common symptoms include the following:   Urinating more often or waking from sleep to urinate    Pain or burning when you urinate    Pain or pressure in your lower abdomen and back    Urine that smells bad    Blood in your urine    Leaking urine    Seek care immediately if:   You are urinating very little or not at all. You have a high fever with shaking chills. You have side or back pain that gets worse. Call your doctor if:   You have a fever. You do not feel better after 2 days of taking antibiotics. You have new symptoms, such as blood or pus in your urine. You are vomiting. You have questions or concerns about your condition or care. Treatment for a UTI  may include antibiotics to treat a bacterial infection. You may also need medicines to decrease pain and burning, or decrease the urge to urinate often. If you have UTIs often (called recurrent UTIs), you may be given antibiotics to take regularly. You will be given directions for when and how to use antibiotics. The goal is to prevent UTIs but not cause antibiotic resistance by using antibiotics too often. Prevent a UTI:   Empty your bladder often. Urinate and empty your bladder as soon as you feel the need. Do not hold your urine for long periods of time. Wipe from front to back after you urinate or have a bowel movement. This will help prevent germs from getting into your urinary tract through your urethra. Drink liquids as directed. Ask how much liquid to drink each day and which liquids are best for you. You may need to drink more liquids than usual to help flush out the bacteria.  Do not drink alcohol, caffeine, or citrus juices. These can irritate your bladder and increase your symptoms. Your healthcare provider may recommend cranberry juice to help prevent a UTI. Urinate before and after you have sex. This can help flush out bacteria passed during sex. Do not douche or use feminine deodorants. These can change the chemical balance in your vagina. Change sanitary pads or tampons often. This will help prevent germs from getting into your urinary tract. Talk to your healthcare provider about your birth control method. You may need to change your method if it is increasing your risk for UTIs. Wear cotton underwear and clothes that are loose. Tight pants and nylon underwear can trap moisture and cause bacteria to grow. Vaginal estrogen may be recommended. This medicine helps prevent UTIs in women who have gone through menopause or are in senia-menopause. Do pelvic muscle exercises often. Pelvic muscle exercises may help you start and stop urinating. Strong pelvic muscles may help you empty your bladder easier. Squeeze these muscles tightly for 5 seconds like you are trying to hold back urine. Then relax for 5 seconds. Gradually work up to squeezing for 10 seconds. Do 3 sets of 15 repetitions a day, or as directed. Follow up with your doctor as directed:  Write down your questions so you remember to ask them during your visits. © Copyright Dionne Mar 2023 Information is for End User's use only and may not be sold, redistributed or otherwise used for commercial purposes. The above information is an  only. It is not intended as medical advice for individual conditions or treatments. Talk to your doctor, nurse or pharmacist before following any medical regimen to see if it is safe and effective for you.

## 2023-11-29 LAB — BACTERIA UR CULT: ABNORMAL

## 2024-01-05 DIAGNOSIS — N92.6 IRREGULAR MENSES: ICD-10-CM

## 2024-01-07 RX ORDER — NORETHINDRONE ACETATE AND ETHINYL ESTRADIOL AND FERROUS FUMARATE 1MG-20(21)
KIT ORAL
Qty: 28 TABLET | Refills: 3 | OUTPATIENT
Start: 2024-01-07

## 2024-02-12 ENCOUNTER — TELEPHONE (OUTPATIENT)
Dept: OBGYN CLINIC | Facility: CLINIC | Age: 19
End: 2024-02-12

## 2024-02-12 DIAGNOSIS — N94.6 DYSMENORRHEA: ICD-10-CM

## 2024-02-12 RX ORDER — LEVONORGESTREL AND ETHINYL ESTRADIOL 0.15-0.03
1 KIT ORAL DAILY
Qty: 90 TABLET | Refills: 2 | Status: SHIPPED | OUTPATIENT
Start: 2024-02-12

## 2024-02-12 NOTE — TELEPHONE ENCOUNTER
----- Message from Keren Andrew sent at 2024 10:37 AM EST -----  [facility] Follow-up  Contact: [phone]  Desmond! I just wanted to give a follow-up on my new birth control. I like it so far, I haven't had any troubles with side-effects, and I didn't have any troubles during my period as well. I think I want to stick with this birth control and not go to my previous one.    Have a good rest of your week!

## 2024-02-12 NOTE — TELEPHONE ENCOUNTER
See patient's e-mail message.  Patient due for yearly 10/2024.  If agree, please sign off on refill to [facility]

## 2024-03-14 ENCOUNTER — VBI (OUTPATIENT)
Dept: ADMINISTRATIVE | Facility: OTHER | Age: 19
End: 2024-03-14

## 2024-06-06 ENCOUNTER — OFFICE VISIT (OUTPATIENT)
Dept: INTERNAL MEDICINE CLINIC | Facility: CLINIC | Age: 19
End: 2024-06-06
Payer: COMMERCIAL

## 2024-06-06 VITALS
DIASTOLIC BLOOD PRESSURE: 63 MMHG | OXYGEN SATURATION: 99 % | TEMPERATURE: 98 F | WEIGHT: 118 LBS | SYSTOLIC BLOOD PRESSURE: 110 MMHG | BODY MASS INDEX: 20.25 KG/M2 | HEART RATE: 63 BPM

## 2024-06-06 DIAGNOSIS — M25.511 CHRONIC PAIN OF BOTH SHOULDERS: ICD-10-CM

## 2024-06-06 DIAGNOSIS — Z00.00 WELLNESS EXAMINATION: Primary | ICD-10-CM

## 2024-06-06 DIAGNOSIS — Z13.220 ENCOUNTER FOR LIPID SCREENING FOR CARDIOVASCULAR DISEASE: ICD-10-CM

## 2024-06-06 DIAGNOSIS — G89.29 CHRONIC PAIN OF BOTH SHOULDERS: ICD-10-CM

## 2024-06-06 DIAGNOSIS — M25.512 CHRONIC PAIN OF BOTH SHOULDERS: ICD-10-CM

## 2024-06-06 DIAGNOSIS — Z13.6 ENCOUNTER FOR LIPID SCREENING FOR CARDIOVASCULAR DISEASE: ICD-10-CM

## 2024-06-06 PROCEDURE — 99395 PREV VISIT EST AGE 18-39: CPT | Performed by: INTERNAL MEDICINE

## 2024-06-06 PROCEDURE — 99213 OFFICE O/P EST LOW 20 MIN: CPT | Performed by: INTERNAL MEDICINE

## 2024-06-06 NOTE — PROGRESS NOTES
Adult Annual Physical  Name: Keren Andrew      : 2005      MRN: [id number]  Encounter Provider: Joao Molina DO  Encounter Date: 2024   Encounter department: [facility] INTER[facility]ICINE    Assessment & Plan   1. Wellness examination  2. Chronic pain of both shoulders  Comments:  suspect rotator cuff injury/strain.  PT eval, limit lifting for now and NSAIDs prn.  if not improving with PT or if worsening -> Ortho eval  Orders:  -     Comprehensive metabolic panel; Future  -     Ambulatory Referral to Physical Therapy; Future  3. Encounter for lipid screening for cardiovascular disease  -     Lipid Panel with Direct LDL reflex; Future    Immunizations and preventive care screenings were discussed with patient today. Appropriate education was printed on patient's after visit summary.    Counseling:  Exercise: the importance of regular exercise/physical activity was discussed. Recommend exercise 3-5 times per week for at least 30 minutes.       Depression Screening and Follow-up Plan: Patient was screened for depression during today's encounter. They screened negative with a PHQ-2 score of 0.        History of Present Illness     Adult Annual Physical:  Patient presents for annual physical.     Diet and Physical Activity:  - Diet/Nutrition: well balanced diet.  - Exercise:. exercising regularly till she developed shoulder pains    Depression Screening:  - PHQ-2 Score: 0    General Health:  - Sleep: sleeps well.  - Hearing: normal hearing bilateral ears.  - Vision: no vision problems.  - Dental: regular dental visits.    /GYN Health:  - Follows with GYN: yes.   - Menopause: premenopausal.   - Contraception: oral contraceptives.      Here for annual physical and another concern related to b/l shoulder pain for the last 6 weeks or so.  Keren is overall doing well otherwise, keeping UTD on health.  She is studying in college and is off for the summer.  She was exercising regularly doing  cardio and some equipment for upper body exercises till she developed b/l shoulder pain, left before right.  She stopped the upper body exercising since and is taking advil for pain for the last 2 weeks but her symptoms have not improved.  She has pain with ROM of both shoulders but no pain at rest.  The pain is unchanged from when it started.  She is LHD.  ROS otherwise negative, no other complaints.    Review of Systems   Constitutional:  Negative for fever.   HENT:  Negative for congestion.    Eyes:  Negative for visual disturbance.   Respiratory:  Negative for shortness of breath.    Cardiovascular:  Negative for chest pain.   Gastrointestinal:  Negative for abdominal pain.   Endocrine: Negative for polyuria.   Genitourinary:  Negative for difficulty urinating.   Musculoskeletal:  Positive for arthralgias. Negative for gait problem.   Skin:  Negative for rash.   Allergic/Immunologic: Negative for immunocompromised state.   Neurological:  Negative for dizziness.   Psychiatric/Behavioral:  Negative for dysphoric mood.          Objective     /63 (BP Location: Left arm, Patient Position: Sitting, Cuff Size: Standard)   Pulse 63   Temp 98 °F (36.7 °C)   Wt 53.5 kg (118 lb)   SpO2 99%   BMI 20.25 kg/m²     Physical Exam  Vitals reviewed.   Constitutional:       General: She is not in acute distress.     Appearance: Normal appearance.   HENT:      Head: Normocephalic and atraumatic.      Right Ear: Tympanic membrane normal.      Left Ear: Tympanic membrane normal.      Mouth/Throat:      Pharynx: Oropharynx is clear.   Eyes:      Conjunctiva/sclera: Conjunctivae normal.   Cardiovascular:      Rate and Rhythm: Normal rate and regular rhythm.      Heart sounds: No murmur heard.  Pulmonary:      Effort: Pulmonary effort is normal.      Breath sounds: No wheezing or rales.   Abdominal:      General: Abdomen is flat. Bowel sounds are normal.      Palpations: Abdomen is soft.      Tenderness: There is no abdominal  tenderness.   Musculoskeletal:      Right shoulder: Tenderness (at AC joint) present. No swelling. Decreased range of motion (due to pain).      Left shoulder: No swelling. Decreased range of motion (due to pain).      Cervical back: Neck supple.      Right lower leg: No edema.      Left lower leg: No edema.   Neurological:      Mental Status: She is alert. Mental status is at baseline.   Psychiatric:         Mood and Affect: Mood normal.         Behavior: Behavior normal.       Administrative Statements

## 2024-06-06 NOTE — PATIENT INSTRUCTIONS
Consider Dr Blayne Limon  Physical therapy  Fasting blood work    Rotator Cuff Tendinitis   AMBULATORY CARE:   Rotator cuff tendinitis  is inflammation of the tendons in your shoulder joint. A tendon is a cord of tough tissue that connects your muscles to your bones. The rotator cuff is made up of a group of muscles and tendons that hold the shoulder joint in place.        Common signs and symptoms:   Pain and swelling in your shoulder, especially when you lift your arm over your head    Pain that is worse after you sleep on the affected shoulder    Pain can become worse and you may have pain even when you are resting    Shoulder and arm weakness    Call your doctor or orthopedist if:   You have sudden shortness of breath or chest pain.    Any part of your arm is numb, tingly, cold, blue, or pale.    You have pain and swelling in your shoulder even after you take pain medicine.    Your skin is itchy, swollen, or has a rash.    Your symptoms are not getting better or are getting worse.    You have questions or concerns about your condition or care.    Treatment  may include any of the following:  Medicines  such as steroids or NSAIDs may be used to reduce swelling. This can help relieve pain. Steroids may be injected into the rotator cuff area. NSAIDs are available without a doctor's order. Ask your healthcare provider which medicine is right for you. Ask how much to take and when to take it. Take as directed. NSAIDs can cause stomach bleeding or kidney problems if not taken correctly.    Surgery  may be needed if the pain and tightening in your shoulder do not go away. This may also be done if pain worsens or is so severe that it affects your daily activities. During surgery, your healthcare provider may remove bone spurs and inflamed tissue around the shoulder.    Physical therapy  can help you improve movement and strength, and decrease pain. A physical therapist will teach you safe exercises. The exercises may  help you move your shoulder normally again and strengthen your rotator cuff. You may learn changes to make to your daily activities that will help decrease stress on your tendons.    Care for your rotator cuff tendinitis at home:   Rest as directed.  Limit activity on your affected shoulder to decrease stress on the tendon. This may help prevent further damage, decrease pain, and promote healing.     Apply ice on your shoulder area.  Ice helps decrease swelling and pain. Ice may also help prevent tissue damage. Use an ice pack, or put crushed ice in a plastic bag. Cover it with a towel and place it on your shoulder for 15 to 20 minutes every hour or as directed.    Keep your shoulder in the correct position so it will heal faster.  This may be done by increasing the height of armrests while you work, drive, and sit. Try not to sleep on the side of your injured shoulder. If you are a woman, wear a sports bra so that the straps are closer to your neck. This may help decrease stress in the affected shoulder.       Follow up with your doctor or orthopedist as directed:  Write down your questions so you remember to ask them during your visits.  © Copyright [employer] 2023 Information is for End User's use only and may not be sold, redistributed or otherwise used for commercial purposes.  The above information is an  only. It is not intended as medical advice for individual conditions or treatments. Talk to your doctor, nurse or pharmacist before following any medical regimen to see if it is safe and effective for you.

## 2024-06-10 ENCOUNTER — APPOINTMENT (OUTPATIENT)
Dept: LAB | Facility: CLINIC | Age: 19
End: 2024-06-10
Payer: COMMERCIAL

## 2024-06-10 DIAGNOSIS — G89.29 CHRONIC PAIN OF BOTH SHOULDERS: ICD-10-CM

## 2024-06-10 DIAGNOSIS — M25.512 CHRONIC PAIN OF BOTH SHOULDERS: ICD-10-CM

## 2024-06-10 DIAGNOSIS — Z13.6 ENCOUNTER FOR LIPID SCREENING FOR CARDIOVASCULAR DISEASE: ICD-10-CM

## 2024-06-10 DIAGNOSIS — M25.511 CHRONIC PAIN OF BOTH SHOULDERS: ICD-10-CM

## 2024-06-10 DIAGNOSIS — Z13.220 ENCOUNTER FOR LIPID SCREENING FOR CARDIOVASCULAR DISEASE: ICD-10-CM

## 2024-06-10 LAB
ALBUMIN SERPL BCP-MCNC: 4.1 G/DL (ref 3.5–5)
ALP SERPL-CCNC: 45 U/L (ref 34–104)
ALT SERPL W P-5'-P-CCNC: 6 U/L (ref 7–52)
ANION GAP SERPL CALCULATED.3IONS-SCNC: 5 MMOL/L (ref 4–13)
AST SERPL W P-5'-P-CCNC: 10 U/L (ref 13–39)
BILIRUB SERPL-MCNC: 0.28 MG/DL (ref 0.2–1)
BUN SERPL-MCNC: 10 MG/DL (ref 5–25)
CALCIUM SERPL-MCNC: 9 MG/DL (ref 8.4–10.2)
CHLORIDE SERPL-SCNC: 108 MMOL/L (ref 96–108)
CHOLEST SERPL-MCNC: 149 MG/DL
CO2 SERPL-SCNC: 26 MMOL/L (ref 21–32)
CREAT SERPL-MCNC: 0.86 MG/DL (ref 0.6–1.3)
GFR SERPL CREATININE-BSD FRML MDRD: 97 ML/MIN/1.73SQ M
GLUCOSE P FAST SERPL-MCNC: 89 MG/DL (ref 65–99)
HDLC SERPL-MCNC: 50 MG/DL
LDLC SERPL CALC-MCNC: 85 MG/DL (ref 0–100)
POTASSIUM SERPL-SCNC: 4.1 MMOL/L (ref 3.5–5.3)
PROT SERPL-MCNC: 6.7 G/DL (ref 6.4–8.4)
SODIUM SERPL-SCNC: 139 MMOL/L (ref 135–147)
TRIGL SERPL-MCNC: 72 MG/DL

## 2024-06-10 PROCEDURE — 80053 COMPREHEN METABOLIC PANEL: CPT

## 2024-06-10 PROCEDURE — 36415 COLL VENOUS BLD VENIPUNCTURE: CPT

## 2024-06-10 PROCEDURE — 80061 LIPID PANEL: CPT

## 2024-06-17 ENCOUNTER — EVALUATION (OUTPATIENT)
Dept: PHYSICAL THERAPY | Facility: REHABILITATION | Age: 19
End: 2024-06-17
Payer: COMMERCIAL

## 2024-06-17 DIAGNOSIS — M25.511 CHRONIC PAIN OF BOTH SHOULDERS: Primary | ICD-10-CM

## 2024-06-17 DIAGNOSIS — G89.29 CHRONIC PAIN OF BOTH SHOULDERS: Primary | ICD-10-CM

## 2024-06-17 DIAGNOSIS — M25.512 CHRONIC PAIN OF BOTH SHOULDERS: Primary | ICD-10-CM

## 2024-06-17 PROCEDURE — 97110 THERAPEUTIC EXERCISES: CPT | Performed by: PHYSICAL THERAPIST

## 2024-06-17 PROCEDURE — 97161 PT EVAL LOW COMPLEX 20 MIN: CPT | Performed by: PHYSICAL THERAPIST

## 2024-06-17 NOTE — PROGRESS NOTES
PT Evaluation     Today's date: 2024  Patient name: Keren Andrew  : 2005  MRN: [id number]  Referring provider: Joao Molina DO  Dx:   Encounter Diagnosis     ICD-10-CM    1. Chronic pain of both shoulders  M25.511 Ambulatory Referral to Physical Therapy    G89.29     M25.512     suspect rotator cuff injury/strain.  PT eval, limit lifting for now and NSAIDs prn.  if not improving with PT or if worsening -> Ortho eval          Start Time: 1805  Stop Time: 1855  Total time in clinic (min): 50 minutes    Assessment  Impairments: abnormal or restricted ROM, activity intolerance, impaired physical strength, lacks appropriate home exercise program and pain with function    Assessment details: Keren Andrew is a 18 y.o. year old female who presents to  with chronic pain of both shoulders. Mild limitations in end range shoulder motion with pain. Positive impingement testing and poor posture with postural strength deficits likely resulting in secondary impingement.  Keren presents with the impairments as listed above and would benefit from Physical Therapy to address these impairments, improved quality of life and to maximize function.         Plan    Frequency: 2x week  Duration in weeks: 4  Plan of Care beginning date: 2024  Plan of Care expiration date: 7/15/2024  Treatment plan discussed with: patient        Subjective Evaluation    History of Present Illness  Mechanism of injury: Patient is a 18 y.o. presenting to physical therapy with complaints of bilateral shoulder pain beginning in mid April of insidious onset. She woke up with shoulder pain. She reports over the last 10 days her pain has subsided but she feels she has continued limited mobility in both shoulders. She also feels popping in the shoulder that is new but is not painful.    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  Patient Goals  Patient goals for therapy: decreased pain, increased motion, return to sport/leisure activities and return to work  Patient goal: being able to do ADLs and exercise without limited mobility  Pain  Current pain ratin  At best pain ratin  At worst pain ratin  Quality: sharp  Relieving factors: medications and rest (Advil prn)  Aggravating factors: overhead activity and lifting (wiping counters, lifting a light box, reaching behind back, reaching overhead)    Social Support  Lives with: parents (two younger siblings, grandma)    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    Tenderness     Left Shoulder   Tenderness in the biceps tendon (proximal). No tenderness in the supraspinatus tendon.     Right Shoulder  No tenderness in the biceps tendon (proximal) and supraspinatus tendon.     Active Range of Motion   Left Shoulder   Flexion: 155 degrees with pain  Abduction: 165 degrees with pain  External rotation 0°: 45 degrees with pain  External rotation BTH: T4   Internal rotation BTB: lumbar     Right Shoulder   Flexion: 145 degrees with pain  Abduction: 150 degrees with pain  External rotation 0°: 55 degrees   External rotation BTH: T4   Internal rotation BTB: lumbar     Additional Active Range of Motion Details  Seated AROM screen:  Flexion 75%  Abduction 75%      Passive Range of Motion   Left Shoulder   Flexion: 160 degrees with pain  Abduction: 165 degrees with pain    Right Shoulder   Flexion: 150 degrees with pain  Abduction: 150 degrees with pain    Strength/Myotome Testing     Left Shoulder     Planes of Motion   Flexion: 4   Abduction: 4   External rotation at 0°: 4   Internal rotation at 0°: 4     Isolated Muscles   Biceps: 4   Lower  trapezius: 3+   Middle trapezius: 3+   Triceps: 4     Right Shoulder     Planes of Motion   Flexion: 4   Abduction: 4   External rotation at 0°: 4   Internal rotation at 0°: 4     Isolated Muscles   Biceps: 4   Lower trapezius: 3+   Middle trapezius: 3+   Triceps: 4     Tests     Right Shoulder   Positive Hawkin's and Neer's.                    Precautions: n/a    Daily Treatment Diary:    Manuals 6/17/2024                                               Neuromuscular Re-education            Pulleys flx/scap nv                                   TB low rows Orange 10x           TB mid rows [place] 10x           TB ER            TB IR             TB bilateral ER w retraction Pink   1x10  1x5           TB horizontal abd                                                Therapeutic Exercise            UBE             Cane ER            Cane flexion 4x p!                                   Prone I’s nv           Prone T’s nv           Prone Y’s            SL ER nv           SL abduction nv           SL flexion            Therapeutic Activity            Wall Ball rolls            Standing Scaption nv                                   * = on HEP

## 2024-06-19 ENCOUNTER — APPOINTMENT (OUTPATIENT)
Dept: PHYSICAL THERAPY | Facility: REHABILITATION | Age: 19
End: 2024-06-19
Payer: COMMERCIAL

## 2024-06-20 ENCOUNTER — OFFICE VISIT (OUTPATIENT)
Dept: URGENT CARE | Facility: CLINIC | Age: 19
End: 2024-06-20
Payer: COMMERCIAL

## 2024-06-20 VITALS
SYSTOLIC BLOOD PRESSURE: 107 MMHG | HEART RATE: 89 BPM | RESPIRATION RATE: 16 BRPM | DIASTOLIC BLOOD PRESSURE: 67 MMHG | OXYGEN SATURATION: 97 % | TEMPERATURE: 97.8 F

## 2024-06-20 DIAGNOSIS — J06.9 VIRAL UPPER RESPIRATORY TRACT INFECTION: ICD-10-CM

## 2024-06-20 DIAGNOSIS — Z11.52 ENCOUNTER FOR SCREENING FOR COVID-19: ICD-10-CM

## 2024-06-20 DIAGNOSIS — J02.9 SORE THROAT: Primary | ICD-10-CM

## 2024-06-20 LAB
S PYO AG THROAT QL: NEGATIVE
SARS-COV-2 AG UPPER RESP QL IA: NEGATIVE
VALID CONTROL: NORMAL

## 2024-06-20 PROCEDURE — 87880 STREP A ASSAY W/OPTIC: CPT | Performed by: NURSE PRACTITIONER

## 2024-06-20 PROCEDURE — G0382 LEV 3 HOSP TYPE B ED VISIT: HCPCS | Performed by: NURSE PRACTITIONER

## 2024-06-20 PROCEDURE — 87811 SARS-COV-2 COVID19 W/OPTIC: CPT | Performed by: NURSE PRACTITIONER

## 2024-06-20 PROCEDURE — S9083 URGENT CARE CENTER GLOBAL: HCPCS | Performed by: NURSE PRACTITIONER

## 2024-06-20 NOTE — PATIENT INSTRUCTIONS
--Rest, drink plenty of fluids  --Rapid strep test negative.   --Rapid COVID test negative.   --Consider vitamin C, zinc, quercetin, and vitamin D to help strengthen your immune system  --For cough, you can take an OTC expectorant such as plain Robitussion or Mucinex (active ingredient guaifenesin).  A spoonful of honey at bedtime may also be helpful, as may a prescription cough medicine.  Also recommended is the use of a cool mist humidifier (with or without Vicks) in the bedroom at night.   --For sore throat, you can take OTC lozenges, use warm gargles (salt water or apple cider vinegar and honey), herbal teas, or an OTC throat spray (Chloraseptic).  --For nasal/sinus congestion, helpful measures include steam, warm compresses, an OTC saline nasal spray or Neti pot, or an OTC decongestant (such as Sudafed).  The decongestant should be avoided, however, if you are under 6 years of age, or have a history of high blood pressure or heart disease.  In addition, an OTC nasal steroid (Flonase, Nasocort) or nasal decongestant (Afrin, Seth-synephrine) may be taken.  The nasal steroid should be used at bedtime, after the saline nasal spray. The nasal decongestant should not be taken more than 3 days consecutively in order to prevent rebound congestion.   --For nasal drainage, postnasal drip, sneezing and itching, an OTC antihistamine (Allegra, Benadryl, etc) can be taken.   --You can take Tylenol or Motrin/Advil as needed for fever, headache, body aches. Motrin/Advil should be avoided, however, if you have a history of heart disease, bleeding ulcers, or if you take blood thinners.   --You should contact your primary care provider and/or go to the ER if your symptoms are not improved or get worse over the next 7 days.  This includes new onset fever, localized ear pain, sinus pain, as well as worsening cough, chest pain, shortness of breath, or significant weakness/fatigue.

## 2024-06-20 NOTE — PROGRESS NOTES
[facility]        NAME: Keren Andrew is a 18 y.o. female  : 2005    MRN: [id number]  DATE: 2024  TIME: 9:49 AM    Assessment and Plan   Sore throat [J02.9]  1. Sore throat  POCT rapid ANTIGEN strepA      2. Viral upper respiratory tract infection        3. Encounter for screening for COVID-19  Poct Covid 19 Rapid Antigen Test            Patient Instructions     Patient Instructions   --Rest, drink plenty of fluids  --Rapid strep test negative.   --Rapid COVID test negative.   --Consider vitamin C, zinc, quercetin, and vitamin D to help strengthen your immune system  --For cough, you can take an OTC expectorant such as plain Robitussion or Mucinex (active ingredient guaifenesin).  A spoonful of honey at bedtime may also be helpful, as may a prescription cough medicine.  Also recommended is the use of a cool mist humidifier (with or without Vicks) in the bedroom at night.   --For sore throat, you can take OTC lozenges, use warm gargles (salt water or apple cider vinegar and honey), herbal teas, or an OTC throat spray (Chloraseptic).  --For nasal/sinus congestion, helpful measures include steam, warm compresses, an OTC saline nasal spray or Neti pot, or an OTC decongestant (such as Sudafed).  The decongestant should be avoided, however, if you are under 6 years of age, or have a history of high blood pressure or heart disease.  In addition, an OTC nasal steroid (Flonase, Nasocort) or nasal decongestant (Afrin, Seth-synephrine) may be taken.  The nasal steroid should be used at bedtime, after the saline nasal spray. The nasal decongestant should not be taken more than 3 days consecutively in order to prevent rebound congestion.   --For nasal drainage, postnasal drip, sneezing and itching, an OTC antihistamine (Allegra, Benadryl, etc) can be taken.   --You can take Tylenol or Motrin/Advil as needed for fever, headache, body aches. Motrin/Advil should be avoided, however, if you have a  history of heart disease, bleeding ulcers, or if you take blood thinners.   --You should contact your primary care provider and/or go to the ER if your symptoms are not improved or get worse over the next 7 days.  This includes new onset fever, localized ear pain, sinus pain, as well as worsening cough, chest pain, shortness of breath, or significant weakness/fatigue.          If tests have been performed at [facility], our office will contact you with results if changes need to be made to the care plan discussed with you at the visit.  You can review your full results on [facility] MyChart.    Chief Complaint   No chief complaint on file.        History of Present Illness       Here with complaints of sore throat, ear pressure, cough, body aches, low grade fevers x 3 days.   Minimal nasal congestion  No abdominal pain, N/V/D.    Taking Motrin.   No known specific sick contacts.          Review of Systems   Review of Systems   Constitutional:  Positive for fever.   HENT:  Positive for ear pain and sore throat. Negative for rhinorrhea.    Respiratory:  Positive for cough. Negative for shortness of breath.    Gastrointestinal:  Negative for abdominal pain, nausea and vomiting.   Musculoskeletal:  Positive for myalgias.   Neurological:  Positive for headaches.         Current Medications       Current Outpatient Medications:     levonorgestrel-ethinyl estradiol (Jolessa) 0.15-0.03 MG per tablet, Take 1 tablet by mouth daily, Disp: 90 tablet, Rfl: 2    Current Allergies     Allergies as of 06/20/2024 - Reviewed 06/17/2024   Allergen Reaction Noted    Penicillins Rash 09/08/2018            The following portions of the patient's history were reviewed and updated as appropriate: allergies, current medications, past family history, past medical history, past social history, past surgical history and problem list.     Past Medical History:   Diagnosis Date    Fracture     Mononucleosis     Raynaud's disease        Past  Surgical History:   Procedure Laterality Date    CLOSED REDUCTION WRIST FRACTURE Left        Family History   Problem Relation Age of Onset    Diabetes Maternal Grandmother     Graves' disease Paternal Grandmother     Cancer Neg Hx     Heart disease Neg Hx          Medications have been verified.        Objective   /67   Pulse 89   Temp 97.8 °F (36.6 °C)   Resp 16   SpO2 97%   No LMP recorded. (Menstrual status: Birth Control).       Physical Exam     Physical Exam  Constitutional:       General: She is not in acute distress.     Appearance: Normal appearance. She is well-developed. She is not ill-appearing, toxic-appearing or diaphoretic.   HENT:      Head: Normocephalic.      Right Ear: Tympanic membrane, ear canal and external ear normal.      Left Ear: Tympanic membrane, ear canal and external ear normal.      Nose: Congestion and rhinorrhea present.      Mouth/Throat:      Pharynx: Posterior oropharyngeal erythema present. No oropharyngeal exudate.      Comments: Tonsils 1+, mildly erythematous, without exudate.    Eyes:      General:         Right eye: No discharge.         Left eye: No discharge.   Cardiovascular:      Rate and Rhythm: Normal rate and regular rhythm.      Heart sounds: Normal heart sounds. No murmur heard.  Pulmonary:      Effort: Pulmonary effort is normal. No respiratory distress.      Breath sounds: Normal breath sounds. No stridor. No wheezing, rhonchi or rales.   Chest:      Chest wall: No tenderness.   Musculoskeletal:      Cervical back: Neck supple.   Lymphadenopathy:      Cervical: No cervical adenopathy.   Skin:     General: Skin is warm and dry.   Neurological:      Mental Status: She is alert and oriented to person, place, and time.      Deep Tendon Reflexes: Reflexes are normal and symmetric.   Psychiatric:         Mood and Affect: Mood normal.

## 2024-06-24 ENCOUNTER — OFFICE VISIT (OUTPATIENT)
Dept: PHYSICAL THERAPY | Facility: REHABILITATION | Age: 19
End: 2024-06-24
Payer: COMMERCIAL

## 2024-06-24 DIAGNOSIS — M25.511 CHRONIC PAIN OF BOTH SHOULDERS: Primary | ICD-10-CM

## 2024-06-24 DIAGNOSIS — M25.512 CHRONIC PAIN OF BOTH SHOULDERS: Primary | ICD-10-CM

## 2024-06-24 DIAGNOSIS — G89.29 CHRONIC PAIN OF BOTH SHOULDERS: Primary | ICD-10-CM

## 2024-06-24 PROCEDURE — 97110 THERAPEUTIC EXERCISES: CPT

## 2024-06-24 PROCEDURE — 97530 THERAPEUTIC ACTIVITIES: CPT

## 2024-06-24 PROCEDURE — 97112 NEUROMUSCULAR REEDUCATION: CPT

## 2024-06-24 NOTE — PROGRESS NOTES
"Daily Note     Today's date: 2024  Patient name: Keren Andrew  : 2005  MRN: [id number]  Referring provider: Joao Molina DO  Dx:   Encounter Diagnosis     ICD-10-CM    1. Chronic pain of both shoulders  M25.511     G89.29     M25.512           Start Time:   Stop Time:   Total time in clinic (min): 50 minutes    Subjective: Patient reports \"no changes\" at start of session, reporting shoulder discomfort as \"the same.\"      Objective: See treatment diary below      Assessment: Tolerated treatment well. Patient demonstrated fatigue post treatment, exhibited good technique with therapeutic exercises, and would benefit from continued PT Initiated POC this session with good tolerance. Fatigues very quickly with all TE performed, with cues required for tolerable ranges with all TE.       Plan: Continue per plan of care.  Progress treatment as tolerated.             Precautions: n/a    Daily Treatment Diary:    Manuals 2024                                              Neuromuscular Re-education            Pulleys flx/scap nv 2' ea                                  TB low rows [place] 10x OTB 2x10          TB mid rows Pink 10x Pink 2x10          TB ER            TB IR             TB bilateral ER w retraction Pink   1x10  1x5 Pink 2x10          TB horizontal abd                                                Therapeutic Exercise            UBE             Cane ER            Cane flexion 4x p!                                   Prone I’s nv 2x10          Prone T’s nv nv          Prone Y’s            SL ER nv 2x10 ea          SL abduction nv X10 ea          SL flexion            Therapeutic Activity            Wall Ball rolls            Standing Scaption nv 2x10                                  * = on HEP         "

## 2024-06-26 ENCOUNTER — OFFICE VISIT (OUTPATIENT)
Dept: PHYSICAL THERAPY | Facility: REHABILITATION | Age: 19
End: 2024-06-26
Payer: COMMERCIAL

## 2024-06-26 DIAGNOSIS — M25.512 CHRONIC PAIN OF BOTH SHOULDERS: Primary | ICD-10-CM

## 2024-06-26 DIAGNOSIS — M25.511 CHRONIC PAIN OF BOTH SHOULDERS: Primary | ICD-10-CM

## 2024-06-26 DIAGNOSIS — G89.29 CHRONIC PAIN OF BOTH SHOULDERS: Primary | ICD-10-CM

## 2024-06-26 PROCEDURE — 97112 NEUROMUSCULAR REEDUCATION: CPT | Performed by: PHYSICAL THERAPIST

## 2024-06-26 PROCEDURE — 97110 THERAPEUTIC EXERCISES: CPT | Performed by: PHYSICAL THERAPIST

## 2024-06-26 NOTE — PROGRESS NOTES
"Daily Note     Today's date: 2024  Patient name: Keren Andrew  : 2005  MRN: [id number]  Referring provider: Joao Molina DO  Dx:   Encounter Diagnosis     ICD-10-CM    1. Chronic pain of both shoulders  M25.511     G89.29     M25.512           Start Time: 1804  Stop Time: 1851  Total time in clinic (min): 47 minutes    Subjective: Patient reports no changes. She reports discomfort with reaching overhead feeling like she doesn't have the motion. She reports muscle fatigue in the upper back.      Objective: See treatment diary below      Assessment: Tolerated treatment fair. Patient demonstrated fatigue post treatment, exhibited good technique with therapeutic exercises, and would benefit from continued PT. Educated patient that muscle soreness is expected and a good indicator of muscle strengthening. Encourage completion of exercises within a pain free range of motion. She is able to achieve about 150* shoulder flexion with AAROM this visit. Patient reports muscle fatigue with all TE, deferring additional sets. Also reports pain/\"popping\" in the shoulder with light resistance, therefore regressed to body weight. Reassure patient that popping is not a concern if not associated with sharp pain/discomfort.      Plan: Continue per plan of care.      Precautions: n/a    Daily Treatment Diary:    Manuals 2024                                             Neuromuscular Re-education            Pulleys flx/scap nv 2' ea 2' ea                                 TB low rows [place] 10x OTB 2x10 OTB 10x         TB mid rows Pink 10x Pink 2x10 [place] 10x         TB ER            TB IR             TB bilateral ER w retraction Pink   1x10  1x5 Pink 2x10 Pink 6x p!         TB horizontal abd                                                Therapeutic Exercise            UBE             Cane ER            Cane flexion 4x p!                                   Prone I’s nv 2x10 2x10         Prone T’s nv nv Pt defers  " "       Prone Y’s            SL ER nv 2x10 ea 2# 4x fatigue    1# 2x10 b/l         SL abduction nv X10 ea 1# 10x \"popping\"    Bw 2x10 b/l         SL flexion            Therapeutic Activity            Wall Ball rolls            Standing Scaption nv 2x10                                  * = on HEP           "

## 2024-07-02 ENCOUNTER — OFFICE VISIT (OUTPATIENT)
Dept: PHYSICAL THERAPY | Facility: REHABILITATION | Age: 19
End: 2024-07-02
Payer: COMMERCIAL

## 2024-07-02 DIAGNOSIS — M25.512 CHRONIC PAIN OF BOTH SHOULDERS: Primary | ICD-10-CM

## 2024-07-02 DIAGNOSIS — G89.29 CHRONIC PAIN OF BOTH SHOULDERS: Primary | ICD-10-CM

## 2024-07-02 DIAGNOSIS — M25.511 CHRONIC PAIN OF BOTH SHOULDERS: Primary | ICD-10-CM

## 2024-07-02 PROCEDURE — 97110 THERAPEUTIC EXERCISES: CPT | Performed by: PHYSICAL THERAPIST

## 2024-07-02 PROCEDURE — 97112 NEUROMUSCULAR REEDUCATION: CPT | Performed by: PHYSICAL THERAPIST

## 2024-07-02 NOTE — PROGRESS NOTES
Daily Note     Today's date: 2024  Patient name: Keren Andrew  : 2005  MRN: [id number]  Referring provider: Joao Molina DO  Dx:   Encounter Diagnosis     ICD-10-CM    1. Chronic pain of both shoulders  M25.511     G89.29     M25.512           Start Time: 1745  Stop Time: 1825  Total time in clinic (min): 40 minutes    Subjective: Patient reports her shoulders have been bothering her more over the last few days. Today she had shooting pain down the arms to the hands while gripping the steering wheel to drive.      Objective: See treatment diary below      Assessment: As per patient request, she will be discharged from PT services at this time to follow up with MD secondary to no improvement in symptoms and worsening pain. she has been given updated HEP with verbalized understanding and compliance. Encourage patient to continue with postural and shoulder strengthening exercises as tolerated. Keren is encouraged to contact PT with any questions or concerns in the future.         Plan: Plan to discharge with comprehensive HEP. Follow up with MD.        Precautions: n/a    Daily Treatment Diary:    Manuals 2024                                            Neuromuscular Re-education            Pulleys flx/scap nv 2' ea 2' ea 2''/2'                                TB low rows Orange 10x OTB 2x10 OTB 10x OTB 2x10         TB mid rows Pink 10x Pink 2x10 Pink 10x Pink 2x10        TB ER            TB IR             TB bilateral ER w retraction Pink   1x10  1x5 Pink 2x10 Pink 6x p! OTB 2x10         TB horizontal abd            Scap star    OTB 5x b/l                                Therapeutic Exercise            UBE             Cane ER            Cane flexion 4x p!                                   Prone I’s nv 2x10 2x10 2x10        Prone T’s nv nv Pt defers 10x        Prone Y’s            SL ER nv 2x10 ea 2# 4x fatigue    1# 2x10 b/l 1# 2x10 b/l        SL abduction nv X10 ea 1# 10x  "\"popping\"    Bw 2x10 b/l         SL flexion            Therapeutic Activity            Wall Ball rolls            Standing Scaption nv 2x10  Strap 2x10                                * = on HEP             "

## 2024-07-03 ENCOUNTER — TELEPHONE (OUTPATIENT)
Age: 19
End: 2024-07-03

## 2024-07-03 DIAGNOSIS — G89.29 CHRONIC PAIN OF BOTH SHOULDERS: Primary | ICD-10-CM

## 2024-07-03 DIAGNOSIS — M25.512 CHRONIC PAIN OF BOTH SHOULDERS: Primary | ICD-10-CM

## 2024-07-03 DIAGNOSIS — M25.511 CHRONIC PAIN OF BOTH SHOULDERS: Primary | ICD-10-CM

## 2024-07-03 NOTE — TELEPHONE ENCOUNTER
Pt calling stating she's been doing PT for about 3 weeks and has not seen relief/a change in her shoulder pain. Was advised by Dr. Molina that if nothing improves, a referral to Ortho could be discussed.     Please contact pt to advise.

## 2024-07-03 NOTE — TELEPHONE ENCOUNTER
Sorry to hear pain is not better    Ref for ortho entered    They will complete x-rays at ortho office    Pls advise Keren to call and schedule    thanks

## 2024-07-15 ENCOUNTER — APPOINTMENT (OUTPATIENT)
Dept: RADIOLOGY | Facility: OTHER | Age: 19
End: 2024-07-15
Payer: COMMERCIAL

## 2024-07-15 ENCOUNTER — OFFICE VISIT (OUTPATIENT)
Dept: OBGYN CLINIC | Facility: OTHER | Age: 19
End: 2024-07-15
Payer: COMMERCIAL

## 2024-07-15 VITALS
SYSTOLIC BLOOD PRESSURE: 110 MMHG | HEART RATE: 87 BPM | BODY MASS INDEX: 19.97 KG/M2 | DIASTOLIC BLOOD PRESSURE: 66 MMHG | HEIGHT: 64 IN | WEIGHT: 117 LBS

## 2024-07-15 DIAGNOSIS — M25.512 LEFT SHOULDER PAIN, UNSPECIFIED CHRONICITY: ICD-10-CM

## 2024-07-15 DIAGNOSIS — M25.512 CHRONIC PAIN OF BOTH SHOULDERS: ICD-10-CM

## 2024-07-15 DIAGNOSIS — M25.511 RIGHT SHOULDER PAIN, UNSPECIFIED CHRONICITY: ICD-10-CM

## 2024-07-15 DIAGNOSIS — G89.29 CHRONIC PAIN OF BOTH SHOULDERS: ICD-10-CM

## 2024-07-15 DIAGNOSIS — M25.511 CHRONIC PAIN OF BOTH SHOULDERS: ICD-10-CM

## 2024-07-15 DIAGNOSIS — M54.12 RADICULOPATHY, CERVICAL REGION: ICD-10-CM

## 2024-07-15 DIAGNOSIS — M25.512 LEFT SHOULDER PAIN, UNSPECIFIED CHRONICITY: Primary | ICD-10-CM

## 2024-07-15 PROCEDURE — 99204 OFFICE O/P NEW MOD 45 MIN: CPT | Performed by: ORTHOPAEDIC SURGERY

## 2024-07-15 PROCEDURE — 73030 X-RAY EXAM OF SHOULDER: CPT

## 2024-07-15 NOTE — PROGRESS NOTES
I Blayne Limon MD personally examined the patient and reviewed the history provided.  I agree with the note and the assessment and plan by  Celina Fisher PA-C .      Assessment:    Bilateral shoulder pain and fatigability    Suspected cervical radiculopathy    Plan:    Reviewed with the patient that given her lack of any traumatic injury to the bilateral shoulders, her benign examination today and her lack of any changes on her plain x-rays I do not think that her symptoms are primarily coming from her shoulders and could very well be secondary to some dysfunction from some subtle cervical radiculopathy.  This is consistent with the symptoms she is having in her neck and would benefit from evaluation with the spine and pain center.  I did offer her an opportunity to obtain an MRI arthrogram of the more symptomatic left shoulder to confirm there is no structural pathology but at this time she will hold off on that and she did except referral to spine and pain as well as referral back to physical therapy so they can work a little more on her cervical spine and shoulder stabilizers.                  Assessment  Diagnoses and all orders for this visit:    Chronic pain of both shoulders      Neck pain          Discussion and Plan:  A discussion was had with the patient that her symptoms may be coming from her neck.  Sometimes nerves in the neck can become irritated and cause symptoms in one or both arms.  A new PT referral was placed today to review a HEP for stretches of the neck and shoulders.  A referral to Spine and Pain management was placed in the chart.  She likely will not require surgical intervention for the left shoulder.  However, if her symptoms fail to resolve, we could consider an MRI arthrogram to evaluate for a structural injury of the shoulder.    Follow-up if symptoms fail to resolve with above measures.    Subjective:   Patient ID: Keren Andrew is a 18 y.o. female      This is a new  "patient presenting to the office today for evaluation of bilateral shoulder pain.  She has had the pain since April or May.  There was no associated trauma, and she just woke up with the pain one day.  It started on the right side, but she noticed left shoulder pain 2 weeks later.  Now her left side is worse than the right.  She was doing therapy twice a week for 3 weeks, but this made her feel \"fatigued\" and \"achey.\"  She says her symptoms of sharp pain are better now, and now she just has aching.  She reports that her family has a history of rotator cuff tears/injuries.            The following portions of the patient's history were reviewed and updated as appropriate: allergies, current medications, past family history, past medical history, past social history, past surgical history and problem list.    Review of Systems    Objective:  /66 (BP Location: Left arm, Patient Position: Sitting, Cuff Size: Adult)   Pulse 87   Ht 5' 4\" (1.626 m) Comment: verbal  Wt 53.1 kg (117 lb)   BMI 20.08 kg/m²       Right Shoulder Exam     Range of Motion   Active abduction:  normal   Forward flexion:  normal     Other   Erythema: absent  Sensation: normal  Pulse: present    Comments:  Positive [place]'s test.      Left Shoulder Exam     Range of Motion   Active abduction:  normal   Forward flexion:  normal     Other   Erythema: absent  Sensation: normal  Pulse: present     Comments:  Positive [place]'s test.            Physical Exam  Constitutional:       Appearance: She is well-developed.   HENT:      Head: Normocephalic and atraumatic.   Eyes:      Conjunctiva/sclera: Conjunctivae normal.   Pulmonary:      Effort: Pulmonary effort is normal. No respiratory distress.   Musculoskeletal:      Cervical back: Normal range of motion.   Skin:     General: Skin is warm and dry.   Neurological:      Mental Status: She is alert and oriented to person, place, and time.   Psychiatric:         Behavior: Behavior normal. "           I have personally reviewed pertinent films in PACS and my interpretation is as follows.    Bilateral Shoulder X-rays 7/15/24:  No acute osseous abnormality.  No fracture or dislocation.  No degenerative changes.        Scribe Attestation      I,:  Celina Fisher PA-C am acting as a scribe while in the presence of the attending physician.:       I,:  Blayne Limon MD personally performed the services described in this documentation    as scribed in my presence.:

## 2024-08-15 ENCOUNTER — CONSULT (OUTPATIENT)
Dept: PAIN MEDICINE | Facility: CLINIC | Age: 19
End: 2024-08-15
Payer: COMMERCIAL

## 2024-08-15 VITALS
BODY MASS INDEX: 19.97 KG/M2 | HEIGHT: 64 IN | DIASTOLIC BLOOD PRESSURE: 72 MMHG | SYSTOLIC BLOOD PRESSURE: 106 MMHG | HEART RATE: 83 BPM | WEIGHT: 117 LBS

## 2024-08-15 DIAGNOSIS — M25.511 CHRONIC PAIN OF BOTH SHOULDERS: ICD-10-CM

## 2024-08-15 DIAGNOSIS — M54.12 RADICULOPATHY, CERVICAL REGION: ICD-10-CM

## 2024-08-15 DIAGNOSIS — G89.29 CHRONIC PAIN OF BOTH SHOULDERS: ICD-10-CM

## 2024-08-15 DIAGNOSIS — M25.512 CHRONIC PAIN OF BOTH SHOULDERS: ICD-10-CM

## 2024-08-15 PROCEDURE — 99244 OFF/OP CNSLTJ NEW/EST MOD 40: CPT | Performed by: ANESTHESIOLOGY

## 2024-08-15 NOTE — LETTER
August 15, 2024     Celina Fisher PA-C  [address] [facility] [address]  [place] [id number]    Patient: Keren Andrew   YOB: 2005   Date of Visit: 8/15/2024       Dear Dr. Fisher:    Thank you for referring Keren Andrew to me for evaluation. Below are my notes for this consultation.    If you have questions, please do not hesitate to call me. I look forward to following your patient along with you.         Sincerely,        Davey Bedolla DO        CC: No Recipients    Davey Bedolla DO  8/15/2024  9:42 AM  Signed  Assessment  1. Chronic pain of both shoulders    2. Radiculopathy, cervical region        Plan  18-year-old female referred by orthopedics, presenting for initial consultation regarding a 4-month history of neck pain that radiates into bilateral upper extremities with associated paresthesias in the fingertips.   X-ray of the shoulders were unremarkable.  No x-ray of the cervical spine to review.  The patient did do physical therapy for her shoulders which did provide some improvement.  In fact she has been 2 to 3 weeks asymptomatic.  She does take ibuprofen as needed which does provide relief.  She has not needed to take this in the past 2 to 3 weeks.  The patient's symptoms are suspicious for cervical radiculopathy which seems to have resolved spontaneously.    1.  I encouraged the patient to resume her normal routine including her gym routine.  If the patient's symptoms return we will order x-ray of the cervical spine as well as order physical therapy for her cervical complaints  2.  Patient may take ibuprofen 600 mg every 8 hours as needed  3.  I will follow-up with the patient on a as needed basis      My impressions and treatment recommendations were discussed in detail with the patient who verbalized understanding and had no further questions.  Discharge instructions were provided. I personally saw and examined the patient and I agree with the above discussed plan of care.    No  orders of the defined types were placed in this encounter.    No orders of the defined types were placed in this encounter.      History of Present Illness    Keren Andrew is a 18 y.o. female referred by orthopedics, presenting for initial consultation regarding a 4-month history of neck pain that radiates into bilateral upper extremities with associated paresthesias in the fingertips.  The pain initially started in the right shoulder, then she was having pain in both shoulders, then she started developing neck pain radiating down the arms to the fingertips.  She denies any weakness of the arms.  She denies any bladder or bowel incontinence or balance issues.  She denies any trauma or inciting event.  X-ray of the shoulders were unremarkable.  No x-ray of the cervical spine to review.  The patient did do physical therapy for her shoulders which did provide some improvement.  In fact she has been 2 to 3 weeks asymptomatic.  She does take ibuprofen as needed which does provide relief.  She has not needed to take this in the past 2 to 3 weeks.  The patient rates her pain currently a 0 out of 10 and the pain is occasional.  The pain does not follow any particular pattern throughout the day.  The pain is described as shooting, sharp, pins-and-needles, dull, and aching.  The pain is decreased with lying down and increased with exercise.    Other than as stated above, the patient denies any interval changes in medications, medical condition, mental condition, symptoms, or allergies since the last office visit.    Other than as stated above, the patient denies any interval changes in medications, medical condition, mental condition, symptoms, or allergies since the last office visit.    I have personally reviewed and/or updated the patient's past medical history, past surgical history, family history, social history, current medications, allergies, and vital signs today.     Review of Systems   Constitutional:  Negative  for fever and unexpected weight change.   HENT:  Negative for trouble swallowing.    Eyes:  Negative for visual disturbance.   Respiratory:  Negative for shortness of breath and wheezing.    Cardiovascular:  Negative for chest pain and palpitations.   Gastrointestinal:  Negative for constipation, diarrhea, nausea and vomiting.   Endocrine: Negative for cold intolerance, heat intolerance and polydipsia.   Genitourinary:  Negative for difficulty urinating and frequency.   Musculoskeletal:  Negative for arthralgias, gait problem, joint swelling and myalgias.   Skin:  Negative for rash.   Neurological:  Negative for dizziness, seizures, syncope, weakness and headaches.   Hematological:  Does not bruise/bleed easily.   Psychiatric/Behavioral:  Negative for dysphoric mood.    All other systems reviewed and are negative.      Patient Active Problem List   Diagnosis   • Patellofemoral syndrome of right knee   • Chronic pain of both shoulders   • Radiculopathy, cervical region       Past Medical History:   Diagnosis Date   • Fracture    • Mononucleosis    • Raynaud's disease        Past Surgical History:   Procedure Laterality Date   • CLOSED REDUCTION WRIST FRACTURE Left        Family History   Problem Relation Age of Onset   • Diabetes Maternal Grandmother    • Graves' disease Paternal Grandmother    • Cancer Neg Hx    • Heart disease Neg Hx        Social History     Occupational History   • Not on file   Tobacco Use   • Smoking status: Never   • Smokeless tobacco: Never   Vaping Use   • Vaping status: Never Used   Substance and Sexual Activity   • Alcohol use: Never   • Drug use: Yes     Types: Marijuana   • Sexual activity: Yes     Partners: Male     Birth control/protection: Condom Male, OCP       Current Outpatient Medications on File Prior to Visit   Medication Sig   • levonorgestrel-ethinyl estradiol (Jolessa) 0.15-0.03 MG per tablet Take 1 tablet by mouth daily     No current facility-administered medications on  "file prior to visit.       Allergies   Allergen Reactions   • Penicillins Rash       Physical Exam    /72   Pulse 83   Ht 5' 4\" (1.626 m)   Wt 53.1 kg (117 lb)   BMI 20.08 kg/m²     Constitutional: normal, well developed, well nourished, alert, in no distress and non-toxic and no overt pain behavior.  Eyes: anicteric  HEENT: grossly intact  Neck: supple, symmetric, trachea midline and no masses   Pulmonary:even and unlabored  Cardiovascular:No edema or pitting edema present  Skin:Normal without rashes or lesions and well hydrated  Psychiatric:Mood and affect appropriate  Neurologic:Cranial Nerves II-XII grossly intact  Musculoskeletal:normal gait.  Bilateral cervical paraspinals and trapezii nontender to palpation.  Bilateral biceps, triceps, and brachioradialis reflexes were 2/4 and symmetrical.  Negative Reyes's reflex bilaterally.  Bilateral upper extremity strength 5/5 in all muscle groups.  Sensation intact to light touch in C5 through T1 dermatomes bilaterally.  Negative Spurling's bilaterally.  Negative Weiss, empty can, and Neer tests bilaterally.    Imaging    Study Result    Narrative & Impression         RIGHT SHOULDER     INDICATION:   Pain in right shoulder.      COMPARISON:  None.     VIEWS:  XR SHOULDER 2+ VW RIGHT     FINDINGS:     There is no acute fracture or dislocation.     No significant degenerative changes.     No lytic or blastic osseous lesion.     Soft tissues are unremarkable.     IMPRESSION:        No acute osseous abnormality.     Electronically signed: 07/15/2024 05:53 PM Jameson Chamberlain MD     Study Result    Narrative & Impression         LEFT SHOULDER     INDICATION:   Pain in left shoulder.      COMPARISON:  None.     VIEWS:  XR SHOULDER 2+ VW LEFT      FINDINGS:     There is no acute fracture or dislocation.     No significant degenerative changes.     No lytic or blastic osseous lesion.     Soft tissues are unremarkable.     IMPRESSION:        No acute osseous " abnormality.     Electronically signed: 07/15/2024 05:52 PM Jameson Chamberlain MD

## 2024-08-15 NOTE — PROGRESS NOTES
Assessment  1. Chronic pain of both shoulders    2. Radiculopathy, cervical region        Plan  18-year-old female referred by orthopedics, presenting for initial consultation regarding a 4-month history of neck pain that radiates into bilateral upper extremities with associated paresthesias in the fingertips.   X-ray of the shoulders were unremarkable.  No x-ray of the cervical spine to review.  The patient did do physical therapy for her shoulders which did provide some improvement.  In fact she has been 2 to 3 weeks asymptomatic.  She does take ibuprofen as needed which does provide relief.  She has not needed to take this in the past 2 to 3 weeks.  The patient's symptoms are suspicious for cervical radiculopathy which seems to have resolved spontaneously.    1.  I encouraged the patient to resume her normal routine including her gym routine.  If the patient's symptoms return we will order x-ray of the cervical spine as well as order physical therapy for her cervical complaints  2.  Patient may take ibuprofen 600 mg every 8 hours as needed  3.  I will follow-up with the patient on a as needed basis      My impressions and treatment recommendations were discussed in detail with the patient who verbalized understanding and had no further questions.  Discharge instructions were provided. I personally saw and examined the patient and I agree with the above discussed plan of care.    No orders of the defined types were placed in this encounter.    No orders of the defined types were placed in this encounter.      History of Present Illness    Keren Andrew is a 18 y.o. female referred by orthopedics, presenting for initial consultation regarding a 4-month history of neck pain that radiates into bilateral upper extremities with associated paresthesias in the fingertips.  The pain initially started in the right shoulder, then she was having pain in both shoulders, then she started developing neck pain radiating down  the arms to the fingertips.  She denies any weakness of the arms.  She denies any bladder or bowel incontinence or balance issues.  She denies any trauma or inciting event.  X-ray of the shoulders were unremarkable.  No x-ray of the cervical spine to review.  The patient did do physical therapy for her shoulders which did provide some improvement.  In fact she has been 2 to 3 weeks asymptomatic.  She does take ibuprofen as needed which does provide relief.  She has not needed to take this in the past 2 to 3 weeks.  The patient rates her pain currently a 0 out of 10 and the pain is occasional.  The pain does not follow any particular pattern throughout the day.  The pain is described as shooting, sharp, pins-and-needles, dull, and aching.  The pain is decreased with lying down and increased with exercise.    Other than as stated above, the patient denies any interval changes in medications, medical condition, mental condition, symptoms, or allergies since the last office visit.    Other than as stated above, the patient denies any interval changes in medications, medical condition, mental condition, symptoms, or allergies since the last office visit.    I have personally reviewed and/or updated the patient's past medical history, past surgical history, family history, social history, current medications, allergies, and vital signs today.     Review of Systems   Constitutional:  Negative for fever and unexpected weight change.   HENT:  Negative for trouble swallowing.    Eyes:  Negative for visual disturbance.   Respiratory:  Negative for shortness of breath and wheezing.    Cardiovascular:  Negative for chest pain and palpitations.   Gastrointestinal:  Negative for constipation, diarrhea, nausea and vomiting.   Endocrine: Negative for cold intolerance, heat intolerance and polydipsia.   Genitourinary:  Negative for difficulty urinating and frequency.   Musculoskeletal:  Negative for arthralgias, gait problem, joint  "swelling and myalgias.   Skin:  Negative for rash.   Neurological:  Negative for dizziness, seizures, syncope, weakness and headaches.   Hematological:  Does not bruise/bleed easily.   Psychiatric/Behavioral:  Negative for dysphoric mood.    All other systems reviewed and are negative.      Patient Active Problem List   Diagnosis    Patellofemoral syndrome of right knee    Chronic pain of both shoulders    Radiculopathy, cervical region       Past Medical History:   Diagnosis Date    Fracture     Mononucleosis     Raynaud's disease        Past Surgical History:   Procedure Laterality Date    CLOSED REDUCTION WRIST FRACTURE Left        Family History   Problem Relation Age of Onset    Diabetes Maternal Grandmother     Graves' disease Paternal Grandmother     Cancer Neg Hx     Heart disease Neg Hx        Social History     Occupational History    Not on file   Tobacco Use    Smoking status: Never    Smokeless tobacco: Never   Vaping Use    Vaping status: Never Used   Substance and Sexual Activity    Alcohol use: Never    Drug use: Yes     Types: Marijuana    Sexual activity: Yes     Partners: Male     Birth control/protection: Condom Male, OCP       Current Outpatient Medications on File Prior to Visit   Medication Sig    levonorgestrel-ethinyl estradiol (Jolessa) 0.15-0.03 MG per tablet Take 1 tablet by mouth daily     No current facility-administered medications on file prior to visit.       Allergies   Allergen Reactions    Penicillins Rash       Physical Exam    /72   Pulse 83   Ht 5' 4\" (1.626 m)   Wt 53.1 kg (117 lb)   BMI 20.08 kg/m²     Constitutional: normal, well developed, well nourished, alert, in no distress and non-toxic and no overt pain behavior.  Eyes: anicteric  HEENT: grossly intact  Neck: supple, symmetric, trachea midline and no masses   Pulmonary:even and unlabored  Cardiovascular:No edema or pitting edema present  Skin:Normal without rashes or lesions and well " hydrated  Psychiatric:Mood and affect appropriate  Neurologic:Cranial Nerves II-XII grossly intact  Musculoskeletal:normal gait.  Bilateral cervical paraspinals and trapezii nontender to palpation.  Bilateral biceps, triceps, and brachioradialis reflexes were 2/4 and symmetrical.  Negative Reyes's reflex bilaterally.  Bilateral upper extremity strength 5/5 in all muscle groups.  Sensation intact to light touch in C5 through T1 dermatomes bilaterally.  Negative Spurling's bilaterally.  Negative Weiss, empty can, and Neer tests bilaterally.    Imaging    Study Result    Narrative & Impression         RIGHT SHOULDER     INDICATION:   Pain in right shoulder.      COMPARISON:  None.     VIEWS:  XR SHOULDER 2+ VW RIGHT     FINDINGS:     There is no acute fracture or dislocation.     No significant degenerative changes.     No lytic or blastic osseous lesion.     Soft tissues are unremarkable.     IMPRESSION:        No acute osseous abnormality.     Electronically signed: 07/15/2024 05:53 PM Jameson Chamberlain MD     Study Result    Narrative & Impression         LEFT SHOULDER     INDICATION:   Pain in left shoulder.      COMPARISON:  None.     VIEWS:  XR SHOULDER 2+ VW LEFT      FINDINGS:     There is no acute fracture or dislocation.     No significant degenerative changes.     No lytic or blastic osseous lesion.     Soft tissues are unremarkable.     IMPRESSION:        No acute osseous abnormality.     Electronically signed: 07/15/2024 05:52 PM Jameson Chamberlain MD

## 2024-10-14 ENCOUNTER — ANNUAL EXAM (OUTPATIENT)
Dept: OBGYN CLINIC | Facility: CLINIC | Age: 19
End: 2024-10-14
Payer: COMMERCIAL

## 2024-10-14 VITALS — WEIGHT: 118.4 LBS | DIASTOLIC BLOOD PRESSURE: 70 MMHG | BODY MASS INDEX: 20.32 KG/M2 | SYSTOLIC BLOOD PRESSURE: 102 MMHG

## 2024-10-14 DIAGNOSIS — N94.6 DYSMENORRHEA: ICD-10-CM

## 2024-10-14 DIAGNOSIS — Z01.419 ENCOUNTER FOR ANNUAL ROUTINE GYNECOLOGICAL EXAMINATION: Primary | ICD-10-CM

## 2024-10-14 PROCEDURE — S0612 ANNUAL GYNECOLOGICAL EXAMINA: HCPCS | Performed by: OBSTETRICS & GYNECOLOGY

## 2024-10-14 RX ORDER — LEVONORGESTREL AND ETHINYL ESTRADIOL 0.15-0.03
1 KIT ORAL DAILY
Qty: 90 TABLET | Refills: 4 | Status: SHIPPED | OUTPATIENT
Start: 2024-10-14

## 2024-10-14 NOTE — PROGRESS NOTES
Ambulatory Visit  Name: Keren Andrew      : 2005      MRN: [id number]  Encounter Provider: Cherry Worthington DO  Encounter Date: 10/14/2024   Encounter department: [facility]    Assessment & Plan  Encounter for annual routine gynecological examination         Dysmenorrhea    Orders:    levonorgestrel-ethinyl estradiol (Jolessa) 0.15-0.03 MG per tablet; Take 1 tablet by mouth daily    Pap smear done for cytology, reflex HPV.  Encouraged self breast examination as well as calcium supplementation.  Offered STD screening, declines, monogamous relationship.  Continue Seasonale x 1 year.  Return to office in 1 year or as needed      History of Present Illness     Keren Andrew is a 19 y.o. female who presents     This is a pleasant 19-year-old female G0 presents for her GYN exam.  She has been on s for over 1 year, cycling every 3 months lasting 5 days with no breakthrough bleeding.  She denieseasonale any changes in bowel or bladder function.  She is sexually active, monogamous relationship 1.5 years.  She is using condoms.    [employer], major psychology    Q 3 months x 5 d, advil d 1 cycle    History obtained from : patient  Review of Systems   Constitutional:  Negative for fatigue, fever and unexpected weight change.   Respiratory:  Negative for cough, chest tightness, shortness of breath and wheezing.    Cardiovascular: Negative.  Negative for chest pain and palpitations.   Gastrointestinal: Negative.  Negative for abdominal distention, abdominal pain, blood in stool, constipation, diarrhea, nausea and vomiting.   Genitourinary: Negative.  Negative for difficulty urinating, dyspareunia, dysuria, flank pain, frequency, genital sores, hematuria, pelvic pain, urgency, vaginal bleeding, vaginal discharge and vaginal pain.   Skin:  Negative for rash.     Current Outpatient Medications on File Prior to Visit   Medication Sig Dispense Refill    [DISCONTINUED] levonorgestrel-ethinyl estradiol  (Jolessa) 0.15-0.03 MG per tablet Take 1 tablet by mouth daily 90 tablet 2     No current facility-administered medications on file prior to visit.          Objective     /70   Wt 53.7 kg (118 lb 6.4 oz)   LMP 10/08/2024   BMI 20.32 kg/m²     Physical Exam  Constitutional:       Appearance: Normal appearance. She is well-developed.   HENT:      Head: Normocephalic and atraumatic.   Cardiovascular:      Rate and Rhythm: Normal rate and regular rhythm.   Pulmonary:      Effort: Pulmonary effort is normal.      Breath sounds: Normal breath sounds.   Chest:   Breasts:     Right: No inverted nipple, mass, nipple discharge, skin change or tenderness.      Left: No inverted nipple, mass, nipple discharge, skin change or tenderness.   Abdominal:      General: Bowel sounds are normal. There is no distension.      Palpations: Abdomen is soft.      Tenderness: There is no abdominal tenderness. There is no guarding or rebound.   Genitourinary:     Labia:         Right: No rash, tenderness or lesion.         Left: No rash, tenderness or lesion.       Vagina: Normal. No signs of injury. No vaginal discharge or tenderness.      Cervix: No cervical motion tenderness, discharge, friability, lesion or cervical bleeding.      Uterus: Not enlarged and not tender.       Adnexa:         Right: No mass, tenderness or fullness.          Left: No mass, tenderness or fullness.     Neurological:      Mental Status: She is alert.   Psychiatric:         Behavior: Behavior normal.       Administrative Statements   I have spent a total time of 25 minutes in caring for this patient on the day of the visit/encounter including Impressions, Counseling / Coordination of care, Documenting in the medical record, Reviewing / ordering tests, medicine, procedures  , and Obtaining or reviewing history  .

## 2024-10-22 LAB
CLINICAL INFO: ABNORMAL
CYTO CVX: ABNORMAL
CYTOLOGY CMNT CVX/VAG CYTO-IMP: ABNORMAL
DATE PREVIOUS BX: ABNORMAL
GEN CATEG CVX/VAG CYTO-IMP: ABNORMAL
HPV E6+E7 MRNA CVX QL NAA+PROBE: DETECTED
LMP START DATE: ABNORMAL
SL AMB PREV. PAP:: ABNORMAL
SPECIMEN SOURCE CVX/VAG CYTO: ABNORMAL

## 2024-10-23 ENCOUNTER — PROCEDURE VISIT (OUTPATIENT)
Dept: OBGYN CLINIC | Facility: CLINIC | Age: 19
End: 2024-10-23
Payer: COMMERCIAL

## 2024-10-23 ENCOUNTER — TELEPHONE (OUTPATIENT)
Age: 19
End: 2024-10-23

## 2024-10-23 VITALS
SYSTOLIC BLOOD PRESSURE: 108 MMHG | WEIGHT: 119.4 LBS | BODY MASS INDEX: 20.38 KG/M2 | DIASTOLIC BLOOD PRESSURE: 62 MMHG | HEIGHT: 64 IN

## 2024-10-23 DIAGNOSIS — R87.810 ATYPICAL SQUAMOUS CELL CHANGES OF UNDETERMINED SIGNIFICANCE (ASCUS) ON CERVICAL CYTOLOGY WITH POSITIVE HIGH RISK HUMAN PAPILLOMA VIRUS (HPV): ICD-10-CM

## 2024-10-23 DIAGNOSIS — R87.610 ATYPICAL SQUAMOUS CELL CHANGES OF UNDETERMINED SIGNIFICANCE (ASCUS) ON CERVICAL CYTOLOGY WITH POSITIVE HIGH RISK HUMAN PAPILLOMA VIRUS (HPV): ICD-10-CM

## 2024-10-23 DIAGNOSIS — R87.610 ATYPICAL SQUAMOUS CELLS OF UNDETERMINED SIGNIFICANCE ON CYTOLOGIC SMEAR OF CERVIX (ASC-US): Primary | ICD-10-CM

## 2024-10-23 PROCEDURE — 88305 TISSUE EXAM BY PATHOLOGIST: CPT | Performed by: PATHOLOGY

## 2024-10-23 PROCEDURE — 57454 BX/CURETT OF CERVIX W/SCOPE: CPT | Performed by: OBSTETRICS & GYNECOLOGY

## 2024-10-23 RX ORDER — DIPHENOXYLATE HYDROCHLORIDE AND ATROPINE SULFATE 2.5; .025 MG/1; MG/1
1 TABLET ORAL DAILY
COMMUNITY

## 2024-10-23 NOTE — PROGRESS NOTES
"Ambulatory Visit  Name: Keren Andrew      : 2005      MRN: [id number]  Encounter Provider: Cherry Worthington DO  Encounter Date: 10/23/2024   Encounter department: [facility]    Assessment & Plan    Implications of abnormal Pap smear reviewed.  Patient appropriately upset.  Reassured patient.  She did receive the Gardasil vaccine.  Colposcopy was carried out.  She will call for results in 1 week.  Provided stable return to office in 1 year or as needed.  History of Present Illness     Keren Andrew is a 19 y.o. female who presents     This is a pleasant 19-year-old female G0 presents for with first abnormal Pap smear revealing ASCUS positive HPV.  Patient is sexually active, monogamous relationship for 1.5 years.  Sexual debut age 13, 7 partners.  No history of STDs.    Gardasil completed     History obtained from : patient  Review of Systems   Constitutional:  Negative for fatigue, fever and unexpected weight change.   Respiratory:  Negative for cough, chest tightness, shortness of breath and wheezing.    Cardiovascular: Negative.  Negative for chest pain and palpitations.   Gastrointestinal: Negative.  Negative for abdominal distention, abdominal pain, blood in stool, constipation, diarrhea, nausea and vomiting.   Genitourinary: Negative.  Negative for difficulty urinating, dyspareunia, dysuria, flank pain, frequency, genital sores, hematuria, pelvic pain, urgency, vaginal bleeding, vaginal discharge and vaginal pain.   Skin:  Negative for rash.     Current Outpatient Medications on File Prior to Visit   Medication Sig Dispense Refill    levonorgestrel-ethinyl estradiol (Jolessa) 0.15-0.03 MG per tablet Take 1 tablet by mouth daily 90 tablet 4    multivitamin (THERAGRAN) TABS Take 1 tablet by mouth daily Olli take two gummies a day       No current facility-administered medications on file prior to visit.          Objective     /62   Ht 5' 4\" (1.626 m)   Wt 54.2 kg (119 lb " 6.4 oz)   LMP 10/08/2024 (Exact Date)   BMI 20.49 kg/m²     Physical Exam  Exam conducted with a chaperone present.         Colposcopy    Date/Time: 10/23/2024 12:30 PM    Performed by: Cherry Worthington DO  Authorized by: Cherry Worthington, DO    Verbal consent obtained?: Yes    Risks and benefits: Risks, benefits and alternatives were discussed    Consent given by:  Patient  Patient states understanding of procedure being performed: Yes    Patient identity confirmed:  Verbally with patient  Pre-procedure:     Premeds:  Ibuprofen    Prepped with: acetic acid and Lugol    Indication:     Indication:  ASC-US  Procedure:     Procedure: Colposcopy w/ cervical biopsy and ECC      Under satisfactory analgesia the patient was prepped and draped in the dorsal lithotomy position: yes      [place] speculum was placed in the vagina: yes      Cervix was cleansed with betadine: yes      Under colposcopic examination the transition zone was seen in entirety: yes      Cervical biopsy performed with a cervical biopsy punch: yes      Monsel's solution was applied: yes      Specimen(s) to pathology: yes    Post-procedure:     Findings: White epithelium      Patient tolerance of procedure:  Tolerated well, no immediate complications  Comments:      Cervix was visualized cleansed with acetic acid.  Thereafter Lugol solution was applied.  Colposcopy was carried out, under green filter light.  The squamocolumnar junction was visualized.  She had a some acetowhitening changes noted at 6:00.  ECC done first followed by biopsy at 6.  Hemostasis was maintained using Monsel solution.  All instruments removed from the vagina.  Patient tolerated the procedure well.     Administrative Statements   I have spent a total time of 37 minutes in caring for this patient on the day of the visit/encounter including Diagnostic results, Prognosis, Risks and benefits of tx options, Instructions for management, Impressions, Counseling / Coordination of  care, Documenting in the medical record, Reviewing / ordering tests, medicine, procedures  , and Obtaining or reviewing history  .

## 2024-10-23 NOTE — TELEPHONE ENCOUNTER
----- Message from Cherry Worthington DO sent at 10/23/2024  9:01 AM EDT -----  Please call the patient regarding her abnormal result.  Inform I just received her Pap results today (she called into the pod regarding results).  Inform patient that Pap is slightly abnormal.  Routinely,  recommend colposcopy to further evaluate abnormal Pap smear.  Please explain procedure to patient.  Inform take Motrin prior to office visit.  If you have problems scheduling colposcopy with me, please contact my secretarial staff.  Thank you

## 2024-10-29 PROCEDURE — 88305 TISSUE EXAM BY PATHOLOGIST: CPT | Performed by: PATHOLOGY

## 2025-03-19 ENCOUNTER — OFFICE VISIT (OUTPATIENT)
Dept: OBGYN CLINIC | Facility: CLINIC | Age: 20
End: 2025-03-19
Payer: COMMERCIAL

## 2025-03-19 VITALS
BODY MASS INDEX: 20.83 KG/M2 | WEIGHT: 122 LBS | SYSTOLIC BLOOD PRESSURE: 116 MMHG | DIASTOLIC BLOOD PRESSURE: 72 MMHG | HEIGHT: 64 IN

## 2025-03-19 DIAGNOSIS — Z30.09 BIRTH CONTROL COUNSELING: Primary | ICD-10-CM

## 2025-03-19 PROCEDURE — 99212 OFFICE O/P EST SF 10 MIN: CPT | Performed by: OBSTETRICS & GYNECOLOGY

## 2025-03-19 NOTE — PROGRESS NOTES
Name: Keren Andrew      : 2005      MRN: [id number]  Encounter Provider: Cherry Worthington DO  Encounter Date: 3/19/2025   Encounter department: [facility]  :  Assessment & Plan  Birth control counseling       Reviewed birth control options including combination hormones, progesterone agents, long-acting, barrier methods.  Risks and benefits reviewed.  At this point she would like to use barrier methods only avoiding all hormones.  She will finish out her current package of OCPs and then monitor her cycles over the next 3 to 4 months and call with update.  All questions answered.  Return to office 10/2025 for annual visit and Pap      History of Present Illness   HPI  Keren Andrew is a 19 y.o. female who presents     This is a 19-year-old female G0 presents for discussion.  She has been on continuous OCPs since 10/2023, cycling every 3 months lasting 4 days with no breakthrough bleeding.  He active, monogamous relationship for 2 years.  Over the course of the months she has complained of increased fatigue, decreased libido, bloating, acne.  She states her partner notices symptoms.    2021 changed from Ortho Tri-Cyclen Lo to Ortho Tri-Cyclen, side effects including vomiting    Discontinued 2022 Loestrin     StartedJolessa 10/2023      History obtained from: patient    Review of Systems   Constitutional:  Negative for fatigue, fever and unexpected weight change.   Respiratory:  Negative for cough, chest tightness, shortness of breath and wheezing.    Cardiovascular: Negative.  Negative for chest pain and palpitations.   Gastrointestinal: Negative.  Negative for abdominal distention, abdominal pain, blood in stool, constipation, diarrhea, nausea and vomiting.   Genitourinary: Negative.  Negative for difficulty urinating, dyspareunia, dysuria, flank pain, frequency, genital sores, hematuria, pelvic pain, urgency, vaginal bleeding, vaginal discharge and vaginal pain.   Skin:  Negative  "for rash.     Current Outpatient Medications on File Prior to Visit   Medication Sig Dispense Refill    levonorgestrel-ethinyl estradiol (Jolessa) 0.15-0.03 MG per tablet Take 1 tablet by mouth daily 90 tablet 4    multivitamin (THERAGRAN) TABS Take 1 tablet by mouth daily Olli take two gummies a day       No current facility-administered medications on file prior to visit.         Objective   /72   Ht 5' 4\" (1.626 m)   Wt 55.3 kg (122 lb)   LMP 01/06/2025 (Exact Date)   BMI 20.94 kg/m²      Physical Exam    Administrative Statements   I have spent a total time of 15 minutes in caring for this patient on the day of the visit/encounter including Risks and benefits of tx options, Instructions for management, Impressions, Counseling / Coordination of care, Documenting in the medical record, Reviewing/placing orders in the medical record (including tests, medications, and/or procedures), and Obtaining or reviewing history  .  "

## 2025-06-09 ENCOUNTER — OFFICE VISIT (OUTPATIENT)
Dept: FAMILY MEDICINE CLINIC | Facility: CLINIC | Age: 20
End: 2025-06-09
Payer: COMMERCIAL

## 2025-06-09 VITALS
DIASTOLIC BLOOD PRESSURE: 72 MMHG | TEMPERATURE: 99.3 F | OXYGEN SATURATION: 99 % | HEIGHT: 64 IN | RESPIRATION RATE: 14 BRPM | WEIGHT: 117 LBS | HEART RATE: 78 BPM | BODY MASS INDEX: 19.97 KG/M2 | SYSTOLIC BLOOD PRESSURE: 112 MMHG

## 2025-06-09 DIAGNOSIS — Z23 ENCOUNTER FOR IMMUNIZATION: ICD-10-CM

## 2025-06-09 DIAGNOSIS — Z13.220 SCREENING FOR LIPID DISORDERS: ICD-10-CM

## 2025-06-09 DIAGNOSIS — Z13.29 SCREENING FOR THYROID DISORDER: ICD-10-CM

## 2025-06-09 DIAGNOSIS — Z13.6 SCREENING FOR CARDIOVASCULAR CONDITION: ICD-10-CM

## 2025-06-09 DIAGNOSIS — Z13.1 SCREENING FOR DIABETES MELLITUS (DM): ICD-10-CM

## 2025-06-09 DIAGNOSIS — Z00.00 ANNUAL PHYSICAL EXAM: Primary | ICD-10-CM

## 2025-06-09 PROCEDURE — 99395 PREV VISIT EST AGE 18-39: CPT

## 2025-06-09 PROCEDURE — 90471 IMMUNIZATION ADMIN: CPT

## 2025-06-09 PROCEDURE — 90715 TDAP VACCINE 7 YRS/> IM: CPT

## 2025-06-09 NOTE — PROGRESS NOTES
Adult Annual Physical  Name: Keren Andrew      : 2005      MRN: [id number]  Encounter Provider: KENIA Jean  Encounter Date: 2025   Encounter department: [facility]    :  Assessment & Plan  Annual physical exam  CPE done, routine labs ordered, pt UTD with cervical cancer screening. Pt reports receiving required doses of Gardasil vaccine as a teenager, declined in office today agreeable to  Tetanus booster, vaccine given and counseling provided  Orders:  •  CBC and differential    Encounter for immunization    Orders:  •  TDAP VACCINE GREATER THAN OR EQUAL TO 8YO IM    Screening for diabetes mellitus (DM)    Orders:  •  Hemoglobin A1C    Screening for lipid disorders    Orders:  •  Lipid panel    Screening for thyroid disorder         Screening for cardiovascular condition    Orders:  •  Comprehensive metabolic panel        Preventive Screenings:  - Diabetes Screening: orders placed  - Cholesterol Screening: orders placed   - Chlamydia Screening: patient declines   - Hepatitis C screening: patient declines   - HIV screening: patient declines   - Cervical cancer screening: screening up-to-date   - Colon cancer screening: screening not indicated   - Lung cancer screening: screening not indicated     Immunizations:  - Immunizations due: HPV (Gardasil 9)  - Risks/benefits immunizations discussed    - Immunizations given per orders      Counseling/Anticipatory Guidance:    - Diet: discussed recommendations for a healthy/well-balanced diet.   - Exercise: the importance of regular exercise/physical activity was discussed. Recommend exercise 3-5 times per week for at least 30 minutes.          History of Present Illness     Adult Annual Physical:  Patient presents for annual physical.     Diet and Physical Activity:  - Diet/Nutrition: no special diet.  - Exercise: no formal exercise.    Depression Screening:  - PHQ-2 Score: 0    General Health:  - Sleep: sleeps well  and 7-8 hours of sleep on average.  - Hearing: normal hearing bilateral ears.  - Vision: most recent eye exam < 1 year ago and wears contacts.  - Dental: regular dental visits and brushes teeth twice daily.    /GYN Health:  - Follows with GYN: yes.   - Last menstrual cycle: 5/16/2025.   - History of STDs: yes    Review of Systems   Constitutional:  Negative for activity change, appetite change, chills, diaphoresis, fatigue and fever.   HENT:  Negative for congestion, dental problem, drooling, ear discharge, ear pain, facial swelling, hearing loss, nosebleeds, postnasal drip, rhinorrhea, sinus pressure, sinus pain, sneezing, sore throat, trouble swallowing and voice change.    Eyes:  Negative for photophobia, pain, discharge, redness, itching and visual disturbance.   Respiratory:  Negative for cough, choking, chest tightness, shortness of breath and wheezing.    Cardiovascular:  Negative for chest pain, palpitations and leg swelling.   Gastrointestinal:  Negative for abdominal distention, abdominal pain, anal bleeding, blood in stool, constipation, diarrhea, nausea, rectal pain and vomiting.   Endocrine: Negative for cold intolerance, heat intolerance, polydipsia, polyphagia and polyuria.   Genitourinary:  Negative for decreased urine volume, difficulty urinating, dyspareunia, dysuria, enuresis, flank pain, frequency, genital sores, hematuria, menstrual problem, pelvic pain, urgency, vaginal discharge and vaginal pain.   Musculoskeletal:  Negative for arthralgias, back pain, gait problem, myalgias, neck pain and neck stiffness.   Skin:  Negative for color change, rash and wound.   Allergic/Immunologic: Negative for environmental allergies, food allergies and immunocompromised state.   Neurological:  Negative for dizziness, tremors, seizures, syncope, facial asymmetry, speech difficulty, weakness, light-headedness, numbness and headaches.   Hematological:  Negative for adenopathy. Does not bruise/bleed easily.  "  Psychiatric/Behavioral:  Negative for agitation, behavioral problems, confusion, decreased concentration, dysphoric mood, hallucinations, self-injury, sleep disturbance and suicidal ideas. The patient is not nervous/anxious and is not hyperactive.    All other systems reviewed and are negative.    Medications Ordered Prior to Encounter[1]   Social History[2]    Objective   /72 (BP Location: Right arm, Patient Position: Sitting, Cuff Size: Standard)   Pulse 78   Temp 99.3 °F (37.4 °C) (Tympanic)   Resp 14   Ht 5' 4\" (1.626 m)   Wt 53.1 kg (117 lb)   SpO2 99%   BMI 20.08 kg/m²     Physical Exam  Vitals and nursing note reviewed.   Constitutional:       General: She is not in acute distress.     Appearance: Normal appearance. She is normal weight. She is not ill-appearing, toxic-appearing or diaphoretic.   HENT:      Head: Normocephalic and atraumatic.      Right Ear: Tympanic membrane, ear canal and external ear normal. There is no impacted cerumen.      Left Ear: Tympanic membrane, ear canal and external ear normal. There is no impacted cerumen.      Nose: Nose normal. No congestion or rhinorrhea.      Mouth/Throat:      Mouth: Mucous membranes are moist.      Pharynx: Oropharynx is clear. No oropharyngeal exudate or posterior oropharyngeal erythema.     Eyes:      General: No scleral icterus.        Right eye: No discharge.         Left eye: No discharge.      Extraocular Movements: Extraocular movements intact.      Conjunctiva/sclera: Conjunctivae normal.      Pupils: Pupils are equal, round, and reactive to light.     Neck:      Vascular: No carotid bruit.     Cardiovascular:      Rate and Rhythm: Normal rate and regular rhythm.      Pulses: Normal pulses.      Heart sounds: Normal heart sounds. No murmur heard.     No friction rub. No gallop.   Pulmonary:      Effort: Pulmonary effort is normal. No respiratory distress.      Breath sounds: Normal breath sounds. No stridor. No wheezing, rhonchi or " rales.   Chest:      Chest wall: No tenderness.   Abdominal:      General: Bowel sounds are normal. There is no distension.      Palpations: Abdomen is soft. There is no mass.      Tenderness: There is no abdominal tenderness. There is no right CVA tenderness, left CVA tenderness, guarding or rebound.      Hernia: No hernia is present.   Genitourinary:     Vagina: No vaginal discharge.     Musculoskeletal:         General: No swelling, tenderness, deformity or signs of injury. Normal range of motion.      Cervical back: Normal range of motion and neck supple. No rigidity or tenderness.      Right lower leg: No edema.      Left lower leg: No edema.   Lymphadenopathy:      Cervical: No cervical adenopathy.     Skin:     General: Skin is warm.      Capillary Refill: Capillary refill takes less than 2 seconds.      Coloration: Skin is not jaundiced or pale.      Findings: No bruising, erythema, lesion or rash.     Neurological:      General: No focal deficit present.      Mental Status: She is alert and oriented to person, place, and time.      Cranial Nerves: No cranial nerve deficit.      Sensory: No sensory deficit.      Motor: No weakness.      Coordination: Coordination normal.      Gait: Gait normal.      Deep Tendon Reflexes: Reflexes normal.     Psychiatric:         Mood and Affect: Mood normal.         Behavior: Behavior normal.         Thought Content: Thought content normal.         Judgment: Judgment normal.                [1]  Current Outpatient Medications on File Prior to Visit   Medication Sig Dispense Refill   • multivitamin (THERAGRAN) TABS Take 1 tablet by mouth in the morning. Olli take two gummies a day.     • [DISCONTINUED] levonorgestrel-ethinyl estradiol (Jolessa) 0.15-0.03 MG per tablet Take 1 tablet by mouth daily (Patient not taking: Reported on 6/9/2025) 90 tablet 4     No current facility-administered medications on file prior to visit.   [2]  Social History  Tobacco Use   • Smoking status:  Never   • Smokeless tobacco: Never   Vaping Use   • Vaping status: Never Used   Substance and Sexual Activity   • Alcohol use: Never   • Drug use: Yes     Types: Marijuana   • Sexual activity: Yes     Partners: Male     Birth control/protection: Condom Male, OCP

## 2025-06-12 ENCOUNTER — APPOINTMENT (OUTPATIENT)
Dept: LAB | Facility: CLINIC | Age: 20
End: 2025-06-12
Payer: COMMERCIAL

## 2025-06-12 LAB
ALBUMIN SERPL BCG-MCNC: 4.4 G/DL (ref 3.5–5)
ALP SERPL-CCNC: 63 U/L (ref 34–104)
ALT SERPL W P-5'-P-CCNC: 8 U/L (ref 7–52)
ANION GAP SERPL CALCULATED.3IONS-SCNC: 3 MMOL/L (ref 4–13)
AST SERPL W P-5'-P-CCNC: 10 U/L (ref 13–39)
BASOPHILS # BLD AUTO: 0.02 THOUSANDS/ÂΜL (ref 0–0.1)
BASOPHILS NFR BLD AUTO: 0 % (ref 0–1)
BILIRUB SERPL-MCNC: 0.37 MG/DL (ref 0.2–1)
BUN SERPL-MCNC: 12 MG/DL (ref 5–25)
CALCIUM SERPL-MCNC: 9.1 MG/DL (ref 8.4–10.2)
CHLORIDE SERPL-SCNC: 106 MMOL/L (ref 96–108)
CHOLEST SERPL-MCNC: 156 MG/DL (ref ?–200)
CO2 SERPL-SCNC: 30 MMOL/L (ref 21–32)
CREAT SERPL-MCNC: 0.78 MG/DL (ref 0.6–1.3)
EOSINOPHIL # BLD AUTO: 0.11 THOUSAND/ÂΜL (ref 0–0.61)
EOSINOPHIL NFR BLD AUTO: 2 % (ref 0–6)
ERYTHROCYTE [DISTWIDTH] IN BLOOD BY AUTOMATED COUNT: 12.2 % (ref 11.6–15.1)
EST. AVERAGE GLUCOSE BLD GHB EST-MCNC: 100 MG/DL
GFR SERPL CREATININE-BSD FRML MDRD: 109 ML/MIN/1.73SQ M
GLUCOSE P FAST SERPL-MCNC: 86 MG/DL (ref 65–99)
HBA1C MFR BLD: 5.1 %
HCT VFR BLD AUTO: 37.7 % (ref 34.8–46.1)
HDLC SERPL-MCNC: 75 MG/DL
HGB BLD-MCNC: 12.4 G/DL (ref 11.5–15.4)
IMM GRANULOCYTES # BLD AUTO: 0.01 THOUSAND/UL (ref 0–0.2)
IMM GRANULOCYTES NFR BLD AUTO: 0 % (ref 0–2)
LDLC SERPL CALC-MCNC: 71 MG/DL (ref 0–100)
LYMPHOCYTES # BLD AUTO: 1.45 THOUSANDS/ÂΜL (ref 0.6–4.47)
LYMPHOCYTES NFR BLD AUTO: 32 % (ref 14–44)
MCH RBC QN AUTO: 30.5 PG (ref 26.8–34.3)
MCHC RBC AUTO-ENTMCNC: 32.9 G/DL (ref 31.4–37.4)
MCV RBC AUTO: 93 FL (ref 82–98)
MONOCYTES # BLD AUTO: 0.35 THOUSAND/ÂΜL (ref 0.17–1.22)
MONOCYTES NFR BLD AUTO: 8 % (ref 4–12)
NEUTROPHILS # BLD AUTO: 2.6 THOUSANDS/ÂΜL (ref 1.85–7.62)
NEUTS SEG NFR BLD AUTO: 58 % (ref 43–75)
NONHDLC SERPL-MCNC: 81 MG/DL
NRBC BLD AUTO-RTO: 0 /100 WBCS
PLATELET # BLD AUTO: 207 THOUSANDS/UL (ref 149–390)
PMV BLD AUTO: 9.7 FL (ref 8.9–12.7)
POTASSIUM SERPL-SCNC: 3.9 MMOL/L (ref 3.5–5.3)
PROT SERPL-MCNC: 7.3 G/DL (ref 6.4–8.4)
RBC # BLD AUTO: 4.06 MILLION/UL (ref 3.81–5.12)
SODIUM SERPL-SCNC: 139 MMOL/L (ref 135–147)
TRIGL SERPL-MCNC: 50 MG/DL (ref ?–150)
WBC # BLD AUTO: 4.54 THOUSAND/UL (ref 4.31–10.16)

## 2025-06-12 PROCEDURE — 80061 LIPID PANEL: CPT

## 2025-06-12 PROCEDURE — 83036 HEMOGLOBIN GLYCOSYLATED A1C: CPT

## 2025-06-12 PROCEDURE — 80053 COMPREHEN METABOLIC PANEL: CPT

## 2025-06-12 PROCEDURE — 85025 COMPLETE CBC W/AUTO DIFF WBC: CPT

## 2025-06-12 PROCEDURE — 36415 COLL VENOUS BLD VENIPUNCTURE: CPT

## 2025-06-13 ENCOUNTER — RESULTS FOLLOW-UP (OUTPATIENT)
Dept: FAMILY MEDICINE CLINIC | Facility: CLINIC | Age: 20
End: 2025-06-13

## 2025-06-13 NOTE — TELEPHONE ENCOUNTER
----- Message from KENIA Kovacs sent at 6/13/2025  8:20 AM EDT -----  Labs without significant findings.  ----- Message -----  From: Lab, Background User  Sent: 6/12/2025  11:13 AM EDT  To: KENIA Jean

## 2025-07-14 ENCOUNTER — OFFICE VISIT (OUTPATIENT)
Dept: OBGYN CLINIC | Facility: CLINIC | Age: 20
End: 2025-07-14
Payer: COMMERCIAL

## 2025-07-14 VITALS
SYSTOLIC BLOOD PRESSURE: 104 MMHG | DIASTOLIC BLOOD PRESSURE: 68 MMHG | HEIGHT: 64 IN | WEIGHT: 116.4 LBS | BODY MASS INDEX: 19.87 KG/M2

## 2025-07-14 DIAGNOSIS — N92.6 IRREGULAR MENSES: Primary | ICD-10-CM

## 2025-07-14 PROCEDURE — 99213 OFFICE O/P EST LOW 20 MIN: CPT | Performed by: OBSTETRICS & GYNECOLOGY

## 2025-07-14 RX ORDER — MEDROXYPROGESTERONE ACETATE 10 MG
10 TABLET ORAL DAILY
Qty: 7 TABLET | Refills: 0 | Status: SHIPPED | OUTPATIENT
Start: 2025-07-14 | End: 2025-07-21

## 2025-07-14 NOTE — PROGRESS NOTES
Name: Keren Andrew      : 2005      MRN: [id number]  Encounter Provider: Cherry Worthington DO  Encounter Date: 2025   Encounter department: [facility]  :  Assessment & Plan  Irregular menses    Orders:    medroxyPROGESTERone (PROVERA) 10 mg tablet; Take 1 tablet (10 mg total) by mouth daily for 7 days    Reviewed menstrual diary.  Given recent discontinuing OCPs, would recommend remaining conservative.  She will take Provera for 7 days and then monitor menstrual cycle over the next couple of months.  Discussed if continued to be regular, would proceed with lab and pelvic ultrasound evaluation.  All questions answered at this time.  She will keep her annual visit as scheduled 10/2025.    History of Present Illness   HPI  Keren Andrew is a 19 y.o. female who presents     This is a pleasant 19-year-old female G0 presents complaining of irregular menses.  She discontinued her birth control pill 3/2025 with first menstrual cycle -, -,  till present, has been bleeding 20 consecutive days described as mild to moderate.  She denies any pelvic pain.  No changes in bowel or bladder function.  In general, she feels better off the birth control pill.  She has been in a monogamous relationship for 2 years.  Her partner graduated college and is now back in [facility], she has not been sexually active in several months.  She has been under a lot of stress, studying for graduate exam, working as a behavioral therapist technician since 2025.  She is also taking classes.  Has been no changes in weight.    Boyfriend in [place]      2025 Hb 12.4    Continous OCP (Jolessa) since 10/2023, q 3 mon x 4 day    D/c libido, bloat, acne    821 changed from Ortho Tri-Cyclen Lo to Ortho Tri-Cyclen, side effects including vomiting    Discontinued 2022 Loestrin  and then started Jolessa 10/2023    Monogamous relationship x 2.5 years    2023 pelvic ultrasound normal uterus,  "ovaries (multi follicle but not considered  PCO    She is inquiring about PCO, endometriosis, PMS.  All questions answered.    10/2024 colpo RKAIG 1    History obtained from: patient    Review of Systems   Constitutional:  Negative for fatigue, fever and unexpected weight change.   Respiratory:  Negative for cough, chest tightness, shortness of breath and wheezing.    Cardiovascular: Negative.  Negative for chest pain and palpitations.   Gastrointestinal: Negative.  Negative for abdominal distention, abdominal pain, blood in stool, constipation, diarrhea, nausea and vomiting.   Genitourinary: Negative.  Negative for difficulty urinating, dyspareunia, dysuria, flank pain, frequency, genital sores, hematuria, pelvic pain, urgency, vaginal bleeding, vaginal discharge and vaginal pain.   Skin:  Negative for rash.     Medications Ordered Prior to Encounter[1]      Objective   /68   Ht 5' 4\" (1.626 m)   Wt 52.8 kg (116 lb 6.4 oz)   LMP 05/16/2025 (Exact Date)   BMI 19.98 kg/m²      Physical Exam    Administrative Statements   I have spent a total time of 20 minutes in caring for this patient on the day of the visit/encounter including Risks and benefits of tx options, Instructions for management, Impressions, Counseling / Coordination of care, Documenting in the medical record, Reviewing/placing orders in the medical record (including tests, medications, and/or procedures), and Obtaining or reviewing history  .       [1]   Current Outpatient Medications on File Prior to Visit   Medication Sig Dispense Refill    multivitamin (THERAGRAN) TABS Take 1 tablet by mouth in the morning. Olli take two gummies a day.       No current facility-administered medications on file prior to visit.     "